# Patient Record
Sex: FEMALE | Race: WHITE | Employment: FULL TIME | RURAL
[De-identification: names, ages, dates, MRNs, and addresses within clinical notes are randomized per-mention and may not be internally consistent; named-entity substitution may affect disease eponyms.]

---

## 2022-03-08 ENCOUNTER — OFFICE VISIT (OUTPATIENT)
Dept: FAMILY MEDICINE CLINIC | Age: 27
End: 2022-03-08
Payer: COMMERCIAL

## 2022-03-08 VITALS
RESPIRATION RATE: 18 BRPM | DIASTOLIC BLOOD PRESSURE: 76 MMHG | HEART RATE: 99 BPM | HEIGHT: 67 IN | BODY MASS INDEX: 24.01 KG/M2 | TEMPERATURE: 98.6 F | WEIGHT: 153 LBS | SYSTOLIC BLOOD PRESSURE: 124 MMHG | OXYGEN SATURATION: 98 %

## 2022-03-08 DIAGNOSIS — Z13.0 SCREENING FOR DEFICIENCY ANEMIA: ICD-10-CM

## 2022-03-08 DIAGNOSIS — R10.11 RUQ ABDOMINAL PAIN: Primary | ICD-10-CM

## 2022-03-08 DIAGNOSIS — E55.9 VITAMIN D DEFICIENCY: ICD-10-CM

## 2022-03-08 DIAGNOSIS — Z13.220 SCREENING FOR HYPERLIPIDEMIA: ICD-10-CM

## 2022-03-08 DIAGNOSIS — D22.9 ATYPICAL MOLE: ICD-10-CM

## 2022-03-08 PROCEDURE — 99203 OFFICE O/P NEW LOW 30 MIN: CPT | Performed by: INTERNAL MEDICINE

## 2022-03-08 NOTE — PATIENT INSTRUCTIONS
Abdominal Pain: Care Instructions  Your Care Instructions     Abdominal pain has many possible causes. Some aren't serious and get better on their own in a few days. Others need more testing and treatment. If your pain continues or gets worse, you need to be rechecked and may need more tests to find out what is wrong. You may need surgery to correct the problem. Don't ignore new symptoms, such as fever, nausea and vomiting, urination problems, pain that gets worse, and dizziness. These may be signs of a more serious problem. Your doctor may have recommended a follow-up visit in the next 8 to 12 hours. If you are not getting better, you may need more tests or treatment. The doctor has checked you carefully, but problems can develop later. If you notice any problems or new symptoms, get medical treatment right away. Follow-up care is a key part of your treatment and safety. Be sure to make and go to all appointments, and call your doctor if you are having problems. It's also a good idea to know your test results and keep a list of the medicines you take. How can you care for yourself at home? · Rest until you feel better. · To prevent dehydration, drink plenty of fluids. Choose water and other clear liquids until you feel better. If you have kidney, heart, or liver disease and have to limit fluids, talk with your doctor before you increase the amount of fluids you drink. · If your stomach is upset, eat mild foods, such as rice, dry toast or crackers, bananas, and applesauce. Try eating several small meals instead of two or three large ones. · Wait until 48 hours after all symptoms have gone away before you have spicy foods, alcohol, and drinks that contain caffeine. · Do not eat foods that are high in fat. · Avoid anti-inflammatory medicines such as aspirin, ibuprofen (Advil, Motrin), and naproxen (Aleve). These can cause stomach upset.  Talk to your doctor if you take daily aspirin for another health problem. When should you call for help? Call 911 anytime you think you may need emergency care. For example, call if:    · You passed out (lost consciousness).     · You pass maroon or very bloody stools.     · You vomit blood or what looks like coffee grounds.     · You have new, severe belly pain. Call your doctor now or seek immediate medical care if:    · Your pain gets worse, especially if it becomes focused in one area of your belly.     · You have a new or higher fever.     · Your stools are black and look like tar, or they have streaks of blood.     · You have unexpected vaginal bleeding.     · You have symptoms of a urinary tract infection. These may include:  ? Pain when you urinate. ? Urinating more often than usual.  ? Blood in your urine.     · You are dizzy or lightheaded, or you feel like you may faint. Watch closely for changes in your health, and be sure to contact your doctor if:    · You are not getting better after 1 day (24 hours). Where can you learn more? Go to http://www.gray.com/  Enter U647 in the search box to learn more about \"Abdominal Pain: Care Instructions. \"  Current as of: July 1, 2021               Content Version: 13.0  © 7257-8356 [employer]. Care instructions adapted under license by [employer] (which disclaims liability or warranty for this information). If you have questions about a medical condition or this instruction, always ask your healthcare professional. [place] [address] any warranty or liability for your use of this information.

## 2022-03-08 NOTE — PROGRESS NOTES
Identified pt with two pt identifiers(name and ). Chief Complaint   Patient presents with    New Patient     est care    Abdominal Pain     RT side under rib cage x 11 months         Health Maintenance Due   Topic    Hepatitis C Screening     Depression Screen     COVID-19 Vaccine (1)    HPV Age 9Y-34Y (1 - 2-dose series)    DTaP/Tdap/Td series (1 - Tdap)    Pap Smear        Wt Readings from Last 3 Encounters:   22 153 lb (69.4 kg)     Temp Readings from Last 3 Encounters:   22 98.6 °F (37 °C) (Temporal)     BP Readings from Last 3 Encounters:   22 124/76     Pulse Readings from Last 3 Encounters:   22 99         Learning Assessment:  :     Learning Assessment 3/8/2022   PRIMARY LEARNER Patient   HIGHEST LEVEL OF EDUCATION - PRIMARY LEARNER  SOME COLLEGE   BARRIERS PRIMARY LEARNER NONE   CO-LEARNER CAREGIVER No   PRIMARY LANGUAGE ENGLISH   LEARNER PREFERENCE PRIMARY DEMONSTRATION   ANSWERED BY patient   RELATIONSHIP SELF       Depression Screening:  :     3 most recent PHQ Screens 3/8/2022   Little interest or pleasure in doing things Not at all   Feeling down, depressed, irritable, or hopeless Not at all   Total Score PHQ 2 0       Fall Risk Assessment:  :     No flowsheet data found. Abuse Screening:  :     Abuse Screening Questionnaire 3/8/2022   Do you ever feel afraid of your partner? N   Are you in a relationship with someone who physically or mentally threatens you? N   Is it safe for you to go home? Y       Coordination of Care Questionnaire:  :     1) Have you been to an emergency room, urgent care clinic since your last visit? no   Hospitalized since your last visit? no             2) Have you seen or consulted any other health care providers outside of [address] [place] [address] since your last visit? yes Dr Janna Coyle 10/2021- [facility]     3) Do you have an Advance Directive on file? no  Are you interested in receiving information about Advance Directives? no    4.   For patients aged 39-70: Has the patient had a colonoscopy / FIT/ Cologuard? NA - based on age      If the patient is female:    11. For patients aged 41-77: Has the patient had a mammogram within the past 2 years? NA - based on age or sex      10. For patients aged 21-65: Has the patient had a pap smear? Yes - Care Gap present.  Rooming MA/LPN to request most recent results Dr Connolly [place] 10/2021

## 2022-03-08 NOTE — PROGRESS NOTES
CHIEF COMPLAINT:   Chief Complaint   Patient presents with    New Patient     est care    Abdominal Pain     RT side under rib cage x 11 months        IMPRESSION AND PLAN:   Diagnoses and all orders for this visit:    1. RUQ abdominal pain  Comments:  On going for past 11 months. Started after she delivered her son. Orders:  -     US ABD LTD; Future  -     LIPASE; Future    2. Screening for hyperlipidemia  -     METABOLIC PANEL, COMPREHENSIVE; Future  -     LIPID PANEL; Future    3. Screening for deficiency anemia  -     CBC WITH AUTOMATED DIFF; Future    4. Vitamin D deficiency  -     VITAMIN D, 25 HYDROXY; Future    5. Atypical mole  -     REFERRAL TO DERMATOLOGY    I have discussed the diagnosis with the patient and the intended treatment plan as seen in the above orders. The patient has received an after-visit summary and questions were answered concerning future plans. Asked to return should symptoms worsen or not improve with treatment. Any pending labs and studies will be relayed to patient when they become available. Pt verbalizes understanding of plan of care and denies further questions or concerns at this time. Follow-up and Dispositions    · Return if symptoms worsen or fail to improve. HISTORY OF PRESENT ILLNESS:   26F who presents today as a new patient [facility].. She is generally healthy and has a now 9 month old baby. Just after delivery, she noticed R-sided cramping pain in the lower abdomen and a bulge which reduced and has not caused any other discomfort. But, she has been plagued by RUQ pain that comes and goes. Sometimes extremely sharp. No work up has been done. It has been worsening over the past 11 months. She reports that she told her gynecologist about this several times, but nothing seemed to be done. Just told \"give it 2-more weeks. \"     Because it is bothering her, she is seeking additional help to find out what is going on. No other constitutional symptoms.  Pain is constant and not improving with time. Little response to OTC medications. Lastly, she is concerned about a mole on the upper right back and along her neck. They have been there long term, but they are getting a little bigger. PAST MEDICAL HISTORY:  History reviewed. No pertinent surgical history. PAST SURGICAL HISTORY:  History reviewed. No pertinent surgical history. MEDICATIONS:  No medications    ALLERGIES:  No Known Allergies     SOCIAL HISTORY:   Social History     Tobacco Use    Smoking status: Never Smoker    Smokeless tobacco: Never Used   Vaping Use    Vaping Use: Never used   Substance Use Topics    Alcohol use: Not Currently    Drug use: Never         FAMILY HISTORY:   Family History   Problem Relation Age of Onset    Cancer Mother  from Leukemia @ 43years old.  Leukemia Mother     No Known Problems Sister     No Known Problems Brother     Dementia Maternal Grandmother     Cancer Maternal Grandfather         colon    No Known Problems Son          REVIEW OF SYSTEMS:  Review of Systems - Negative except for documented in the HPI. PHYSICAL EXAMINATION:  Visit Vitals  /76 (BP 1 Location: Right arm, BP Patient Position: Sitting, BP Cuff Size: Adult)   Pulse 99   Temp 98.6 °F (37 °C) (Temporal)   Resp 18   Ht 5' 7\" (1.702 m)   Wt 153 lb (69.4 kg)   LMP 2022 (Exact Date)   SpO2 98%   BMI 23.96 kg/m²     General appearance: alert, well appearing, and in no distress. Chest: clear to auscultation, no wheezes, rales or rhonchi, symmetric air entry. CVS exam: normal rate, regular rhythm, normal S1, S2, no murmurs, rubs, clicks or gallops. Abdominal exam: tenderness noted RUQ   Hepatomegaly? Liver edge felt 1-FB below ribcage. .  Exam of extremities: peripheral pulses normal, no pedal edema, no clubbing or cyanosis  Skin exam - normal coloration and turgor, no rashes, no suspicious skin lesions noted.   Neurological exam reveals alert, oriented, normal speech, no focal findings or movement disorder noted.       LABORATORY DATA:  Orders/pending

## 2022-03-10 ENCOUNTER — APPOINTMENT (OUTPATIENT)
Dept: FAMILY MEDICINE CLINIC | Age: 27
End: 2022-03-10

## 2022-03-11 LAB
25(OH)D3+25(OH)D2 SERPL-MCNC: 22.5 NG/ML (ref 30–100)
ALBUMIN SERPL-MCNC: 4.5 G/DL (ref 3.9–5)
ALBUMIN/GLOB SERPL: 1.7 {RATIO} (ref 1.2–2.2)
ALP SERPL-CCNC: 53 IU/L (ref 44–121)
ALT SERPL-CCNC: 11 IU/L (ref 0–32)
AST SERPL-CCNC: 17 IU/L (ref 0–40)
BASOPHILS # BLD AUTO: 0 X10E3/UL (ref 0–0.2)
BASOPHILS NFR BLD AUTO: 1 %
BILIRUB SERPL-MCNC: 0.5 MG/DL (ref 0–1.2)
BUN SERPL-MCNC: 10 MG/DL (ref 6–20)
BUN/CREAT SERPL: 13 (ref 9–23)
CALCIUM SERPL-MCNC: 9.6 MG/DL (ref 8.7–10.2)
CHLORIDE SERPL-SCNC: 103 MMOL/L (ref 96–106)
CHOLEST SERPL-MCNC: 155 MG/DL (ref 100–199)
CO2 SERPL-SCNC: 21 MMOL/L (ref 20–29)
CREAT SERPL-MCNC: 0.75 MG/DL (ref 0.57–1)
EGFR: 113 ML/MIN/1.73
EOSINOPHIL # BLD AUTO: 0.1 X10E3/UL (ref 0–0.4)
EOSINOPHIL NFR BLD AUTO: 1 %
ERYTHROCYTE [DISTWIDTH] IN BLOOD BY AUTOMATED COUNT: 12.2 % (ref 11.7–15.4)
GLOBULIN SER CALC-MCNC: 2.7 G/DL (ref 1.5–4.5)
GLUCOSE SERPL-MCNC: 76 MG/DL (ref 65–99)
HCT VFR BLD AUTO: 43.1 % (ref 34–46.6)
HDLC SERPL-MCNC: 47 MG/DL
HGB BLD-MCNC: 14.5 G/DL (ref 11.1–15.9)
IMM GRANULOCYTES # BLD AUTO: 0 X10E3/UL (ref 0–0.1)
IMM GRANULOCYTES NFR BLD AUTO: 0 %
IMP & REVIEW OF LAB RESULTS: NORMAL
INTERPRETATION: NORMAL
LDLC SERPL CALC-MCNC: 97 MG/DL (ref 0–99)
LYMPHOCYTES # BLD AUTO: 1.8 X10E3/UL (ref 0.7–3.1)
LYMPHOCYTES NFR BLD AUTO: 29 %
MCH RBC QN AUTO: 30.7 PG (ref 26.6–33)
MCHC RBC AUTO-ENTMCNC: 33.6 G/DL (ref 31.5–35.7)
MCV RBC AUTO: 91 FL (ref 79–97)
MONOCYTES # BLD AUTO: 0.5 X10E3/UL (ref 0.1–0.9)
MONOCYTES NFR BLD AUTO: 8 %
NEUTROPHILS # BLD AUTO: 3.9 X10E3/UL (ref 1.4–7)
NEUTROPHILS NFR BLD AUTO: 61 %
PLATELET # BLD AUTO: 297 X10E3/UL (ref 150–450)
POTASSIUM SERPL-SCNC: 4.7 MMOL/L (ref 3.5–5.2)
PROT SERPL-MCNC: 7.2 G/DL (ref 6–8.5)
RBC # BLD AUTO: 4.72 X10E6/UL (ref 3.77–5.28)
SODIUM SERPL-SCNC: 139 MMOL/L (ref 134–144)
TRIGL SERPL-MCNC: 55 MG/DL (ref 0–149)
VLDLC SERPL CALC-MCNC: 11 MG/DL (ref 5–40)
WBC # BLD AUTO: 6.3 X10E3/UL (ref 3.4–10.8)

## 2022-03-17 ENCOUNTER — HOSPITAL ENCOUNTER (OUTPATIENT)
Dept: ULTRASOUND IMAGING | Age: 27
Discharge: HOME | End: 2022-03-17
Attending: INTERNAL MEDICINE
Payer: COMMERCIAL

## 2022-03-17 ENCOUNTER — TELEPHONE (OUTPATIENT)
Dept: FAMILY MEDICINE CLINIC | Age: 27
End: 2022-03-17

## 2022-03-17 DIAGNOSIS — R10.11 RUQ ABDOMINAL PAIN: ICD-10-CM

## 2022-03-17 DIAGNOSIS — R10.11 RIGHT UPPER QUADRANT ABDOMINAL PAIN: Primary | ICD-10-CM

## 2022-03-17 PROCEDURE — 76705 ECHO EXAM OF ABDOMEN: CPT

## 2022-03-17 NOTE — TELEPHONE ENCOUNTER
Called pt and relayed results. She stated that it has not gotten better over the last week and has been going on for 11 months. She wants to know if CT can be ordered.

## 2022-03-17 NOTE — PROGRESS NOTES
Please let the patient know that her US was normal for the right side of the abdomen. If her symptoms persist or worsen, then I am happy to get a Ct-scan for further evaluation. But no acute issues were noted.

## 2022-03-17 NOTE — TELEPHONE ENCOUNTER
----- Message from Huma Paz MD sent at 3/17/2022 12:33 PM EDT -----  Please let the patient know that her US was normal for the right side of the abdomen. If her symptoms persist or worsen, then I am happy to get a Ct-scan for further evaluation. But no acute issues were noted.

## 2022-07-03 ENCOUNTER — HOSPITAL ENCOUNTER (EMERGENCY)
Age: 27
Discharge: HOME | End: 2022-07-03
Attending: EMERGENCY MEDICINE
Payer: COMMERCIAL

## 2022-07-03 ENCOUNTER — APPOINTMENT (OUTPATIENT)
Dept: CT IMAGING | Age: 27
End: 2022-07-03
Attending: STUDENT IN AN ORGANIZED HEALTH CARE EDUCATION/TRAINING PROGRAM
Payer: COMMERCIAL

## 2022-07-03 VITALS
WEIGHT: 145 LBS | SYSTOLIC BLOOD PRESSURE: 123 MMHG | HEART RATE: 111 BPM | OXYGEN SATURATION: 100 % | TEMPERATURE: 98 F | DIASTOLIC BLOOD PRESSURE: 89 MMHG | HEIGHT: 67 IN | BODY MASS INDEX: 22.76 KG/M2 | RESPIRATION RATE: 16 BRPM

## 2022-07-03 DIAGNOSIS — R10.11 ABDOMINAL PAIN, RIGHT UPPER QUADRANT: Primary | ICD-10-CM

## 2022-07-03 LAB
ALBUMIN SERPL-MCNC: 3.9 G/DL (ref 3.5–5)
ALBUMIN/GLOB SERPL: 1.1 {RATIO} (ref 1.1–2.2)
ALP SERPL-CCNC: 52 U/L (ref 45–117)
ALT SERPL-CCNC: 25 U/L (ref 12–78)
ANION GAP SERPL CALC-SCNC: 8 MMOL/L (ref 5–15)
APPEARANCE UR: CLEAR
AST SERPL-CCNC: 19 U/L (ref 15–37)
BACTERIA URNS QL MICRO: NEGATIVE /HPF
BASOPHILS # BLD: 0 K/UL (ref 0–0.1)
BASOPHILS NFR BLD: 0 % (ref 0–1)
BILIRUB SERPL-MCNC: 0.3 MG/DL (ref 0.2–1)
BILIRUB UR QL: NEGATIVE
BUN SERPL-MCNC: 10 MG/DL (ref 6–20)
BUN/CREAT SERPL: 12 (ref 12–20)
CALCIUM SERPL-MCNC: 8.9 MG/DL (ref 8.5–10.1)
CHLORIDE SERPL-SCNC: 107 MMOL/L (ref 97–108)
CO2 SERPL-SCNC: 25 MMOL/L (ref 21–32)
COLOR UR: NORMAL
COMMENT, HOLDF: NORMAL
CREAT SERPL-MCNC: 0.81 MG/DL (ref 0.55–1.02)
DIFFERENTIAL METHOD BLD: ABNORMAL
EOSINOPHIL # BLD: 0 K/UL (ref 0–0.4)
EOSINOPHIL NFR BLD: 0 % (ref 0–7)
EPITH CASTS URNS QL MICRO: NORMAL /LPF
ERYTHROCYTE [DISTWIDTH] IN BLOOD BY AUTOMATED COUNT: 12.3 % (ref 11.5–14.5)
GLOBULIN SER CALC-MCNC: 3.7 G/DL (ref 2–4)
GLUCOSE SERPL-MCNC: 72 MG/DL (ref 65–100)
GLUCOSE UR STRIP.AUTO-MCNC: NEGATIVE MG/DL
HCG UR QL: NEGATIVE
HCT VFR BLD AUTO: 39.9 % (ref 35–47)
HGB BLD-MCNC: 13.6 G/DL (ref 11.5–16)
HGB UR QL STRIP: NEGATIVE
HYALINE CASTS URNS QL MICRO: NORMAL /LPF (ref 0–2)
IMM GRANULOCYTES # BLD AUTO: 0 K/UL (ref 0–0.04)
IMM GRANULOCYTES NFR BLD AUTO: 0 % (ref 0–0.5)
KETONES UR QL STRIP.AUTO: NEGATIVE MG/DL
LEUKOCYTE ESTERASE UR QL STRIP.AUTO: NEGATIVE
LIPASE SERPL-CCNC: 180 U/L (ref 73–393)
LYMPHOCYTES # BLD: 1.6 K/UL (ref 0.8–3.5)
LYMPHOCYTES NFR BLD: 45 % (ref 12–49)
MCH RBC QN AUTO: 30.4 PG (ref 26–34)
MCHC RBC AUTO-ENTMCNC: 34.1 G/DL (ref 30–36.5)
MCV RBC AUTO: 89.3 FL (ref 80–99)
MONOCYTES # BLD: 0.7 K/UL (ref 0–1)
MONOCYTES NFR BLD: 21 % (ref 5–13)
NEUTS SEG # BLD: 1.2 K/UL (ref 1.8–8)
NEUTS SEG NFR BLD: 34 % (ref 32–75)
NITRITE UR QL STRIP.AUTO: NEGATIVE
NRBC # BLD: 0 K/UL (ref 0–0.01)
NRBC BLD-RTO: 0 PER 100 WBC
PH UR STRIP: 6.5 [PH] (ref 5–8)
PLATELET # BLD AUTO: 225 K/UL (ref 150–400)
PMV BLD AUTO: 10.5 FL (ref 8.9–12.9)
POTASSIUM SERPL-SCNC: 3.9 MMOL/L (ref 3.5–5.1)
PROT SERPL-MCNC: 7.6 G/DL (ref 6.4–8.2)
PROT UR STRIP-MCNC: NEGATIVE MG/DL
RBC # BLD AUTO: 4.47 M/UL (ref 3.8–5.2)
RBC #/AREA URNS HPF: NORMAL /HPF (ref 0–5)
RBC MORPH BLD: ABNORMAL
SAMPLES BEING HELD,HOLD: NORMAL
SODIUM SERPL-SCNC: 140 MMOL/L (ref 136–145)
SP GR UR REFRACTOMETRY: 1.01 (ref 1–1.03)
UR CULT HOLD, URHOLD: NORMAL
UROBILINOGEN UR QL STRIP.AUTO: 0.2 EU/DL (ref 0.2–1)
WBC # BLD AUTO: 3.5 K/UL (ref 3.6–11)
WBC URNS QL MICRO: NORMAL /HPF (ref 0–4)

## 2022-07-03 PROCEDURE — 81025 URINE PREGNANCY TEST: CPT

## 2022-07-03 PROCEDURE — 99285 EMERGENCY DEPT VISIT HI MDM: CPT

## 2022-07-03 PROCEDURE — 36415 COLL VENOUS BLD VENIPUNCTURE: CPT

## 2022-07-03 PROCEDURE — 85025 COMPLETE CBC W/AUTO DIFF WBC: CPT

## 2022-07-03 PROCEDURE — 83690 ASSAY OF LIPASE: CPT

## 2022-07-03 PROCEDURE — 74177 CT ABD & PELVIS W/CONTRAST: CPT

## 2022-07-03 PROCEDURE — 80053 COMPREHEN METABOLIC PANEL: CPT

## 2022-07-03 PROCEDURE — 74011000636 HC RX REV CODE- 636: Performed by: EMERGENCY MEDICINE

## 2022-07-03 PROCEDURE — 81001 URINALYSIS AUTO W/SCOPE: CPT

## 2022-07-03 RX ADMIN — IOPAMIDOL 100 ML: 755 INJECTION, SOLUTION INTRAVENOUS at 13:35

## 2022-07-03 NOTE — DISCHARGE INSTRUCTIONS
Continue to monitor symptoms at home. Take Advil or Tylenol as needed for pain. Follow-up with PCP and GI and return with any changes or worsening.

## 2022-07-03 NOTE — ED TRIAGE NOTES
Pt to ED for c/o R sided abd pain x1 year intermittent. Has been referred to GI and has not made appointment.  States \"just coming to get it checked\"     Denies urinary sx, N/V/D, fever or chills

## 2022-07-03 NOTE — ED PROVIDER NOTES
60-year-old female with no significant past medical history presents to ED with 1 year of intermittent right-sided abdominal pain. Patient reports that about a couple weeks after she gave birth to her son she started noticing intermittent right-sided abdominal pain that came on suddenly and was sharp in nature, lasting for couple hours and then resolving. This happens a couple times a month. She has brought up to her OB/GYN several times and talk to her PCP about this. Her PCP ordered a right upper quadrant ultrasound and did lab and urine work which was unremarkable. Her PCP also ordered a CT which patient never scheduled. [facility] again blood and urine was unremarkable, they recommended that she see GI. She has not made this appointment yet. She notes that she came into the ED today to try to figure out what is going on. She denies any nausea, vomiting, diarrhea, chest pain, shortness of breath, dysuria, urinary frequency. Abdominal Pain   Pertinent negatives include no fever, no nausea, no vomiting, no dysuria, no headaches, no myalgias and no chest pain. No past medical history on file. No past surgical history on file.       Family History:   Problem Relation Age of Onset   Yoly Curl Cancer Mother     Leukemia Mother     No Known Problems Sister     No Known Problems Brother     Dementia Maternal Grandmother     Cancer Maternal Grandfather         colon    No Known Problems Son        Social History     Socioeconomic History    Marital status: SINGLE     Spouse name: Not on file    Number of children: Not on file    Years of education: Not on file    Highest education level: Not on file   Occupational History    Not on file   Tobacco Use    Smoking status: Never Smoker    Smokeless tobacco: Never Used   Vaping Use    Vaping Use: Never used   Substance and Sexual Activity    Alcohol use: Not Currently    Drug use: Never    Sexual activity: Yes     Partners: Male     Birth control/protection: Condom   Other Topics Concern    Not on file   Social History Narrative    Not on file     Social Determinants of Health     Financial Resource Strain:     Difficulty of Paying Living Expenses: Not on file   Food Insecurity:     Worried About Running Out of Food in the Last Year: Not on file    Ronald of Food in the Last Year: Not on file   Transportation Needs:     Lack of Transportation (Medical): Not on file    Lack of Transportation (Non-Medical): Not on file   Physical Activity:     Days of Exercise per Week: Not on file    Minutes of Exercise per Session: Not on file   Stress:     Feeling of Stress : Not on file   Social Connections:     Frequency of Communication with Friends and Family: Not on file    Frequency of Social Gatherings with Friends and Family: Not on file    Attends Rastafari Services: Not on file    Active Member of [address] [place] [address] [employer] or Organizations: Not on file    Attends Club or Organization Meetings: Not on file    Marital Status: Not on file   Intimate Partner Violence:     Fear of Current or Ex-Partner: Not on file    Emotionally Abused: Not on file    Physically Abused: Not on file    Sexually Abused: Not on file   Housing Stability:     Unable to Pay for Housing in the Last Year: Not on file    Number of [place] in the Last Year: Not on file    Unstable Housing in the Last Year: Not on file         ALLERGIES: Patient has no known allergies. Review of Systems   Constitutional: Negative for fever. HENT: Negative for congestion and sinus pressure. Respiratory: Negative for shortness of breath. Cardiovascular: Negative for chest pain. Gastrointestinal: Positive for abdominal pain. Negative for nausea and vomiting. Genitourinary: Negative for dysuria. Musculoskeletal: Negative for myalgias. Neurological: Negative for dizziness and headaches. Hematological: Negative for adenopathy. Psychiatric/Behavioral: The patient is not nervous/anxious. All other systems reviewed and are negative. Vitals:    07/03/22 1142 07/03/22 1143   BP: 123/89    Pulse: (!) 111    Resp: 16    Temp:  98 °F (36.7 °C)   SpO2: 100%    Weight: 65.8 kg (145 lb)    Height: 5' 7\" (1.702 m)             Physical Exam  Vitals and nursing note reviewed. Constitutional:       General: She is not in acute distress. Appearance: Normal appearance. She is normal weight. HENT:      Head: Normocephalic and atraumatic. Eyes:      Extraocular Movements: Extraocular movements intact. Pupils: Pupils are equal, round, and reactive to light. Cardiovascular:      Rate and Rhythm: Normal rate and regular rhythm. Heart sounds: Normal heart sounds. Pulmonary:      Breath sounds: Normal breath sounds. Abdominal:      Palpations: Abdomen is soft. Tenderness: There is no abdominal tenderness. Lymphadenopathy:      Cervical: No cervical adenopathy. Skin:     General: Skin is warm and dry. Neurological:      General: No focal deficit present. Mental Status: She is alert and oriented to person, place, and time. Psychiatric:         Mood and Affect: Mood normal.         Behavior: Behavior normal.         Thought Content: Thought content normal.          MDM  Number of Diagnoses or Management Options  Abdominal pain, right upper quadrant  Diagnosis management comments: 52-year-old female with no significant past medical history presents to ED with 1 year of intermittent right-sided abdominal pain. Vital signs stable in triage and patient is afebrile. Physical exam unremarkable with no abdominal tenderness. Patient has already had multiple negative work-ups with PCP and IBD but labs and urine showed no acute abnormalities today and CT of abdomen and pelvis showed no acute findings. Patient will be discharged with instructions for conservative care, follow-up and return precautions.        Amount and/or Complexity of Data Reviewed  Clinical lab tests: ordered and reviewed  Tests in the radiology section of CPT®: reviewed and ordered  Discuss the patient with other providers: yes           Procedures

## 2022-07-19 ENCOUNTER — OFFICE VISIT (OUTPATIENT)
Dept: FAMILY MEDICINE CLINIC | Age: 27
End: 2022-07-19
Payer: COMMERCIAL

## 2022-07-19 ENCOUNTER — APPOINTMENT (OUTPATIENT)
Dept: FAMILY MEDICINE CLINIC | Age: 27
End: 2022-07-19

## 2022-07-19 VITALS
RESPIRATION RATE: 16 BRPM | OXYGEN SATURATION: 98 % | HEIGHT: 67 IN | HEART RATE: 61 BPM | SYSTOLIC BLOOD PRESSURE: 122 MMHG | TEMPERATURE: 97.6 F | BODY MASS INDEX: 22.6 KG/M2 | DIASTOLIC BLOOD PRESSURE: 68 MMHG | WEIGHT: 144 LBS

## 2022-07-19 DIAGNOSIS — Z11.59 ENCOUNTER FOR HEPATITIS C SCREENING TEST FOR LOW RISK PATIENT: ICD-10-CM

## 2022-07-19 DIAGNOSIS — R51.9 PERSISTENT HEADACHES: Primary | ICD-10-CM

## 2022-07-19 DIAGNOSIS — D72.821 MONOCYTOSIS: ICD-10-CM

## 2022-07-19 DIAGNOSIS — Z87.39 HISTORY OF SCOLIOSIS: ICD-10-CM

## 2022-07-19 PROCEDURE — 99214 OFFICE O/P EST MOD 30 MIN: CPT | Performed by: INTERNAL MEDICINE

## 2022-07-19 RX ORDER — IBUPROFEN 200 MG
600 TABLET ORAL
COMMUNITY

## 2022-07-19 NOTE — PATIENT INSTRUCTIONS
Headache: Care Instructions  Your Care Instructions     Headaches have many possible causes. Most headaches aren't a sign of a more serious problem, and they will get better on their own. Home treatment may help you feel better faster. The doctor has checked you carefully, but problems can develop later. If you notice any problems or new symptoms, get medical treatment right away. Follow-up care is a key part of your treatment and safety. Be sure to make and go to all appointments, and call your doctor if you are having problems. It's also a good idea to know your test results and keep a list of the medicines you take. How can you care for yourself at home? · Do not drive if you have taken a prescription pain medicine. · Rest in a quiet, dark room until your headache is gone. Close your eyes and try to relax or go to sleep. Don't watch TV or read. · Put a cold, moist cloth or cold pack on the painful area for 10 to 20 minutes at a time. Put a thin cloth between the cold pack and your skin. · Use a warm, moist towel or a heating pad set on low to relax tight shoulder and neck muscles. · Have someone gently massage your neck and shoulders. · Take pain medicines exactly as directed. ? If the doctor gave you a prescription medicine for pain, take it as prescribed. ? If you are not taking a prescription pain medicine, ask your doctor if you can take an over-the-counter medicine. · Be careful not to take pain medicine more often than the instructions allow, because you may get worse or more frequent headaches when the medicine wears off. · Do not ignore new symptoms that occur with a headache, such as a fever, weakness or numbness, vision changes, or confusion. These may be signs of a more serious problem. To prevent headaches  · Keep a headache diary so you can figure out what triggers your headaches. Avoiding triggers may help you prevent headaches.  Record when each headache began, how long it lasted, and what the pain was like (throbbing, aching, stabbing, or dull). Write down any other symptoms you had with the headache, such as nausea, flashing lights or dark spots, or sensitivity to bright light or loud noise. Note if the headache occurred near your period. List anything that might have triggered the headache, such as certain foods (chocolate, cheese, wine) or odors, smoke, bright light, stress, or lack of sleep. · Find healthy ways to deal with stress. Headaches are most common during or right after stressful times. Take time to relax before and after you do something that has caused a headache in the past.  · Try to keep your muscles relaxed by keeping good posture. Check your jaw, face, neck, and shoulder muscles for tension, and try relaxing them. When sitting at a desk, change positions often, and stretch for 30 seconds each hour. · Get plenty of sleep and exercise. · Eat regularly and well. Long periods without food can trigger a headache. · Treat yourself to a massage. Some people find that regular massages are very helpful in relieving tension. · Limit caffeine by not drinking too much coffee, tea, or soda. But don't quit caffeine suddenly, because that can also give you headaches. · Reduce eyestrain from computers by blinking frequently and looking away from the computer screen every so often. Make sure you have proper eyewear and that your monitor is set up properly, about an arm's length away. · Seek help if you have depression or anxiety. Your headaches may be linked to these conditions. Treatment can both prevent headaches and help with symptoms of anxiety or depression. When should you call for help? Call 911 anytime you think you may need emergency care. For example, call if:    · You have signs of a stroke. These may include:  ? Sudden numbness, paralysis, or weakness in your face, arm, or leg, especially on only one side of your body. ? Sudden vision changes.   ? Sudden trouble speaking. ? Sudden confusion or trouble understanding simple statements. ? Sudden problems with walking or balance. ? A sudden, severe headache that is different from past headaches. Call your doctor now or seek immediate medical care if:    · You have a new or worse headache.     · Your headache gets much worse. Where can you learn more? Go to http://www.francisco.com/  Enter M271 in the search box to learn more about \"Headache: Care Instructions. \"  Current as of: December 13, 2021               Content Version: 13.2  © 7144-2882 [employer]. Care instructions adapted under license by [employer] (which disclaims liability or warranty for this information). If you have questions about a medical condition or this instruction, always ask your healthcare professional. [address] any warranty or liability for your use of this information. Tension Headache: Care Instructions  Overview  Most headaches are tension headaches. Some people get them often, especially if they have a lot of stress in their lives. This kind of headache may cause pain or a feeling of pressure all over your head. Sometimes it's hard to know where the center of the pain is. If you get a lot of these kind of headaches, the best way to reduce them is to find out what's causing them. Then you can make changes in those areas. Follow-up care is a key part of your treatment and safety. Be sure to make and go to all appointments, and call your doctor if you are having problems. It's also a good idea to know your test results and keep a list of the medicines you take. How can you care for yourself at home? · Rest in a quiet, dark room. Put a cool cloth on your forehead. Close your eyes, and try to relax or go to sleep. Do not watch TV, read, or use the computer. · Use a warm, moist towel or a heating pad set on low to relax tight shoulder and neck muscles.   · Have someone gently massage your neck and shoulders. · Be safe with medicines. Read and follow all instructions on the label. ? If the doctor gave you a prescription medicine for pain, take it as prescribed. ? If you are not taking a prescription pain medicine, ask your doctor if you can take an over-the-counter medicine. · Be careful not to take more pain medicine than the instructions say. This is because you may get worse or more frequent headaches when the medicine wears off. · If you get a headache, stop what you are doing and sit quietly for a moment. Close your eyes and breathe slowly. Try to relax your head and neck muscles. · Pay attention to any new symptoms you have when you have a headache. These include a fever, weakness or numbness, vision changes, or confusion. They may be signs of a more serious problem. How can you prevent tension headaches? Here are some things you can do to help prevent tension headaches. · Keep a headache diary. This can help you and your doctor figure out what is triggering your headaches. If you avoid your triggers, you may be able to prevent headaches. · Find healthy ways to deal with stress. Headaches are most common during or right after stressful times. · Get plenty of exercise every day. This can help with stress and muscle tension. · Get regular sleep. · Eat regularly and well. If you wait too long to eat, it can trigger a headache. · Try to use good posture and keep the muscles of your jaw, face, neck, and shoulders relaxed. · If you use a computer a lot, give your eyes a break by blinking more and sometimes looking away from the screen. Use glasses or contacts if you need them. And check that your monitor is about an arm's distance away. When should you call for help? Call 911 anytime you think you may need emergency care. For example, call if:    · You have signs of a stroke.  These may include:  ? Sudden numbness, paralysis, or weakness in your face, arm, or leg, especially on only one side of your body. ? Sudden vision changes. ? Sudden trouble speaking. ? Sudden confusion or trouble understanding simple statements. ? Sudden problems with walking or balance. ? A sudden, severe headache that is different from past headaches. Call your doctor now or seek immediate medical care if:    · You have new or worse nausea and vomiting.     · You have a new or higher fever.     · Your headache gets much worse. Watch closely for changes in your health, and be sure to contact your doctor if:    · You are not getting better after 2 days (48 hours). Where can you learn more? Go to http://www.gray.com/  Enter C544 in the search box to learn more about \"Tension Headache: Care Instructions. \"  Current as of: December 13, 2021               Content Version: 13.2  © 0750-7313 [employer]. Care instructions adapted under license by [employer] (which disclaims liability or warranty for this information). If you have questions about a medical condition or this instruction, always ask your healthcare professional. [place] [address] any warranty or liability for your use of this information.

## 2022-07-19 NOTE — PROGRESS NOTES
Identified pt with two pt identifiers(name and ). Chief Complaint   Patient presents with    Headache     at least 4 x a week    Scoliosis        Health Maintenance Due   Topic    Hepatitis C Screening     COVID-19 Vaccine (1)    HPV Age 9Y-34Y (1 - 2-dose series)    DTaP/Tdap/Td series (1 - Tdap)    Pap Smear        Wt Readings from Last 3 Encounters:   22 144 lb (65.3 kg)   22 145 lb (65.8 kg)   22 153 lb (69.4 kg)     Temp Readings from Last 3 Encounters:   22 97.6 °F (36.4 °C) (Temporal)   22 98 °F (36.7 °C)   22 98.6 °F (37 °C) (Temporal)     BP Readings from Last 3 Encounters:   22 122/68   22 123/89   22 124/76     Pulse Readings from Last 3 Encounters:   22 61   22 (!) 111   22 99         Learning Assessment:  :     Learning Assessment 3/8/2022   PRIMARY LEARNER Patient   HIGHEST LEVEL OF EDUCATION - PRIMARY LEARNER  SOME COLLEGE   BARRIERS PRIMARY LEARNER NONE   CO-LEARNER CAREGIVER No   PRIMARY LANGUAGE ENGLISH   LEARNER PREFERENCE PRIMARY DEMONSTRATION   ANSWERED BY patient   RELATIONSHIP SELF       Depression Screening:  :     3 most recent PHQ Screens 3/8/2022   Little interest or pleasure in doing things Not at all   Feeling down, depressed, irritable, or hopeless Not at all   Total Score PHQ 2 0       Fall Risk Assessment:  :     No flowsheet data found. Abuse Screening:  :     Abuse Screening Questionnaire 3/8/2022   Do you ever feel afraid of your partner? N   Are you in a relationship with someone who physically or mentally threatens you? N   Is it safe for you to go home? Y       Coordination of Care Questionnaire:  :     1) Have you been to an emergency room, urgent care clinic since your last visit? yes [facility] 7/3/22 & [facility]  Hospitalized since your last visit? no             2) Have you seen or consulted any other health care providers outside of [address] [place] [address] since your last visit? no  (Include any pap smears or colon screenings in this section.)    3) Do you have an Advance Directive on file? no  Are you interested in receiving information about Advance Directives? no    4. For patients aged 39-70: Has the patient had a colonoscopy / FIT/ Cologuard? NA      If the patient is female:    5. For patients aged 41-77: Has the patient had a mammogram within the past 2 years? NA - based on age or sex      10. For patients aged 21-65: Has the patient had a pap smear?  Yes - no Care Gap present 10/2021 Dr Yong Francisco

## 2022-07-21 LAB
BASOPHILS # BLD AUTO: 0 X10E3/UL (ref 0–0.2)
BASOPHILS NFR BLD AUTO: 1 %
EOSINOPHIL # BLD AUTO: 0.1 X10E3/UL (ref 0–0.4)
EOSINOPHIL NFR BLD AUTO: 1 %
ERYTHROCYTE [DISTWIDTH] IN BLOOD BY AUTOMATED COUNT: 12.3 % (ref 11.7–15.4)
HCT VFR BLD AUTO: 44.6 % (ref 34–46.6)
HCV AB S/CO SERPL IA: <0.1 S/CO RATIO (ref 0–0.9)
HGB BLD-MCNC: 14.8 G/DL (ref 11.1–15.9)
IMM GRANULOCYTES # BLD AUTO: 0 X10E3/UL (ref 0–0.1)
IMM GRANULOCYTES NFR BLD AUTO: 0 %
LYMPHOCYTES # BLD AUTO: 1.7 X10E3/UL (ref 0.7–3.1)
LYMPHOCYTES NFR BLD AUTO: 35 %
MCH RBC QN AUTO: 30.2 PG (ref 26.6–33)
MCHC RBC AUTO-ENTMCNC: 33.2 G/DL (ref 31.5–35.7)
MCV RBC AUTO: 91 FL (ref 79–97)
MONOCYTES # BLD AUTO: 0.4 X10E3/UL (ref 0.1–0.9)
MONOCYTES NFR BLD AUTO: 9 %
NEUTROPHILS # BLD AUTO: 2.6 X10E3/UL (ref 1.4–7)
NEUTROPHILS NFR BLD AUTO: 54 %
PATH INTERP BLD-IMP: NORMAL
PATH REV BLD -IMP: NORMAL
PATHOLOGIST NAME: NORMAL
PLATELET # BLD AUTO: 295 X10E3/UL (ref 150–450)
RBC # BLD AUTO: 4.9 X10E6/UL (ref 3.77–5.28)
WBC # BLD AUTO: 4.8 X10E3/UL (ref 3.4–10.8)

## 2022-07-24 NOTE — PROGRESS NOTES
Chief Complaint   Patient presents with    Headache     at least 4 x a week    Scoliosis       Assessment/ Plan:   Diagnoses and all orders for this visit:    1. Persistent headaches  -     CT HEAD W WO CONT; Future    2. Monocytosis  -     CBC WITH AUTOMATED DIFF; Future  -     PATHOLOGIST REVIEW SMEARS    3. Encounter for hepatitis C screening test for low risk patient  -     HEPATITIS C AB; Future    4. History of scoliosis  Comments:  Had bracing in adolescents. feels like her posture is worsening. Orders:  -     XR SPINE ENTIRE T-L , SKULL TO SACRUM 2 OR 3 VWS SCOLIOSIS; Future    Other orders  -     HEPATITIS C AB    I have discussed the diagnosis with the patient and the intended treatment plan as seen in the above orders. The patient has received an after-visit summary and questions were answered concerning future plans. Asked to return should symptoms worsen or not improve with treatment. Any pending labs and studies will be relayed to patient when they become available. Pt verbalizes understanding of plan of care and denies further questions or concerns at this time. Follow-up and Dispositions    Return if symptoms worsen or fail to improve. Subjective:   Radha Navarro is a 33 y.o. female who presents with several concerns. First, she has had a persistent headache for the past several weeks on and off. It is in the posterior R-side of her head. She denies any trauma. No skin lesions in the area. She is not having the pain today. The pain generally stays in this area, but she can feel vice like pressure of her entire scalp. She has noticed some mild visual disturbance, but denies photophobia. No previous history of migraine headaches. Feels that these are not like her usual headaches. They are more intense when they come on and last longer. Also, she would like evaluation of possible scoliosis (recurrent).  She reports that she did wear a brace in adolescence and is worried that her posture is getting worse despite trying to control by posture and movement. The patient also recently went to the ER c/o RUQ abdominal pain which we had done a work up and also asked for CT-scan. Per ER report/course:    ER Course 7/3/2022:  \"33year-old female with no significant past medical history presents to ED with 1 year of intermittent right-sided abdominal pain. Patient reports that about a couple weeks after she gave birth to her son she started noticing intermittent right-sided abdominal pain that came on suddenly and was sharp in nature, lasting for couple hours and then resolving. This happens a couple times a month. She has brought up to her OB/GYN several times and talk to her PCP about this. Her PCP ordered a right upper quadrant ultrasound and did lab and urine work which was unremarkable. Her PCP also ordered a CT which patient never scheduled. [facility] again blood and urine was unremarkable, they recommended that she see GI. She has not made this appointment yet. She notes that she came into the ED today to try to figure out what is going on. She denies any nausea, vomiting, diarrhea, chest pain, shortness of breath, dysuria, urinary frequency. CT was done in the ER and noted to be normal/negative. I have reviewed as outlined below     CT Results (most recent):  Results from Hospital Encounter encounter on 07/03/22    CT ABD PELV W CONT    Narrative  EXAM: CT ABD PELV W CONT    INDICATION: intermittent RUQ abd pain; negative US    COMPARISON: None    CONTRAST: 100 mL of Isovue-370. ORAL CONTRAST: None    TECHNIQUE:  Following the uneventful intravenous administration of contrast, thin axial  images were obtained through the abdomen and pelvis. Coronal and sagittal  reconstructions were generated.  CT dose reduction was achieved through use of a  standardized protocol tailored for this examination and automatic exposure  control for dose modulation. FINDINGS:  LOWER THORAX: No significant abnormality in the incidentally imaged lower chest.  LIVER: No mass. BILIARY TREE: Gallbladder is within normal limits. CBD is not dilated. SPLEEN: within normal limits. PANCREAS: No mass or ductal dilatation. ADRENALS: Unremarkable. KIDNEYS: No mass, calculus, or hydronephrosis. STOMACH: Unremarkable. SMALL BOWEL: No dilatation or wall thickening. COLON: No dilatation or wall thickening. APPENDIX: Appears normal  PERITONEUM: No ascites or pneumoperitoneum. RETROPERITONEUM: No lymphadenopathy or aortic aneurysm. REPRODUCTIVE ORGANS: Appears normal with multiple follicles within both ovaries  URINARY BLADDER: No mass or calculus. BONES: No destructive bone lesion. ABDOMINAL WALL: No mass or hernia. ADDITIONAL COMMENTS: N/A    Impression  No acute findings seen. She did have unremarkable CT in the ER as noted above. She now denies any abdominal pain. I note that she is a first time mother with an 13 month old at home. She has had some anxieties about her health since having her baby. She denies any additional concerns today. HISTORICAL  PMH, PSH, FHX, SOCHX, ALLERGIES and MES were reviewed and updated today. Review of Systems  Review of Systems   Musculoskeletal:  Positive for back pain. Neurological:  Positive for headaches. All other systems reviewed and are negative. Objective:     Vitals:    07/19/22 1010   BP: 122/68   Pulse: 61   Resp: 16   Temp: 97.6 °F (36.4 °C)   TempSrc: Temporal   SpO2: 98%   Weight: 144 lb (65.3 kg)   Height: 5' 7\" (1.702 m)       Physical Exam  Vitals and nursing note reviewed. Constitutional:       Appearance: Normal appearance. HENT:      Head: Normocephalic and atraumatic. Mouth/Throat:      Mouth: Mucous membranes are moist.   Eyes:      Extraocular Movements: Extraocular movements intact.       Conjunctiva/sclera: Conjunctivae normal.      Pupils: Pupils are equal, round, and reactive to light. Cardiovascular:      Rate and Rhythm: Normal rate and regular rhythm. Pulses: Normal pulses. Heart sounds: Normal heart sounds. Pulmonary:      Effort: Pulmonary effort is normal.      Breath sounds: Normal breath sounds. Musculoskeletal:      Cervical back: Normal range of motion and neck supple. Neurological:      General: No focal deficit present. Mental Status: She is alert. Mental status is at baseline. Cranial Nerves: No cranial nerve deficit. Sensory: No sensory deficit. Motor: No weakness. Coordination: Coordination normal.      Gait: Gait normal.      Deep Tendon Reflexes: Reflexes normal.   Psychiatric:         Mood and Affect: Mood normal.         Behavior: Behavior normal.         Thought Content: Thought content normal.         Judgment: Judgment normal.     Meredith Angel MD  [facility]     Patient Instructions        Headache: Care Instructions  Your Care Instructions     Headaches have many possible causes. Most headaches aren't a sign of a more serious problem, and they will get better on their own. Home treatment may help you feel better faster. The doctor has checked you carefully, but problems can develop later. If you notice any problems or new symptoms, get medical treatment right away. Follow-up care is a key part of your treatment and safety. Be sure to make and go to all appointments, and call your doctor if you are having problems. It's also a good idea to know your test results and keep a list of the medicines you take. How can you care for yourself at home? Do not drive if you have taken a prescription pain medicine. Rest in a quiet, dark room until your headache is gone. Close your eyes and try to relax or go to sleep. Don't watch TV or read. Put a cold, moist cloth or cold pack on the painful area for 10 to 20 minutes at a time. Put a thin cloth between the cold pack and your skin.   Use a warm, moist towel or a heating pad set on low to relax tight shoulder and neck muscles. Have someone gently massage your neck and shoulders. Take pain medicines exactly as directed. If the doctor gave you a prescription medicine for pain, take it as prescribed. If you are not taking a prescription pain medicine, ask your doctor if you can take an over-the-counter medicine. Be careful not to take pain medicine more often than the instructions allow, because you may get worse or more frequent headaches when the medicine wears off. Do not ignore new symptoms that occur with a headache, such as a fever, weakness or numbness, vision changes, or confusion. These may be signs of a more serious problem. To prevent headaches  Keep a headache diary so you can figure out what triggers your headaches. Avoiding triggers may help you prevent headaches. Record when each headache began, how long it lasted, and what the pain was like (throbbing, aching, stabbing, or dull). Write down any other symptoms you had with the headache, such as nausea, flashing lights or dark spots, or sensitivity to bright light or loud noise. Note if the headache occurred near your period. List anything that might have triggered the headache, such as certain foods (chocolate, cheese, wine) or odors, smoke, bright light, stress, or lack of sleep. Find healthy ways to deal with stress. Headaches are most common during or right after stressful times. Take time to relax before and after you do something that has caused a headache in the past.  Try to keep your muscles relaxed by keeping good posture. Check your jaw, face, neck, and shoulder muscles for tension, and try relaxing them. When sitting at a desk, change positions often, and stretch for 30 seconds each hour. Get plenty of sleep and exercise. Eat regularly and well. Long periods without food can trigger a headache. Treat yourself to a massage.  Some people find that regular massages are very helpful in relieving tension. Limit caffeine by not drinking too much coffee, tea, or soda. But don't quit caffeine suddenly, because that can also give you headaches. Reduce eyestrain from computers by blinking frequently and looking away from the computer screen every so often. Make sure you have proper eyewear and that your monitor is set up properly, about an arm's length away. Seek help if you have depression or anxiety. Your headaches may be linked to these conditions. Treatment can both prevent headaches and help with symptoms of anxiety or depression. When should you call for help? Call 911 anytime you think you may need emergency care. For example, call if:    You have signs of a stroke. These may include:  Sudden numbness, paralysis, or weakness in your face, arm, or leg, especially on only one side of your body. Sudden vision changes. Sudden trouble speaking. Sudden confusion or trouble understanding simple statements. Sudden problems with walking or balance. A sudden, severe headache that is different from past headaches. Call your doctor now or seek immediate medical care if:    You have a new or worse headache. Your headache gets much worse. Where can you learn more? Go to http://www.francisco.com/  Enter M271 in the search box to learn more about \"Headache: Care Instructions. \"  Current as of: December 13, 2021               Content Version: 13.2  © 7286-3942 [employer]. Care instructions adapted under license by [employer] (which disclaims liability or warranty for this information). If you have questions about a medical condition or this instruction, always ask your healthcare professional. [place] [address] any warranty or liability for your use of this information. Tension Headache: Care Instructions  Overview  Most headaches are tension headaches.  Some people get them often, especially if they have a lot of stress in their lives. This kind of headache may cause pain or a feeling of pressure all over your head. Sometimes it's hard to know where the center of the pain is. If you get a lot of these kind of headaches, the best way to reduce them is to find out what's causing them. Then you can make changes in those areas. Follow-up care is a key part of your treatment and safety. Be sure to make and go to all appointments, and call your doctor if you are having problems. It's also a good idea to know your test results and keep a list of the medicines you take. How can you care for yourself at home? Rest in a quiet, dark room. Put a cool cloth on your forehead. Close your eyes, and try to relax or go to sleep. Do not watch TV, read, or use the computer. Use a warm, moist towel or a heating pad set on low to relax tight shoulder and neck muscles. Have someone gently massage your neck and shoulders. Be safe with medicines. Read and follow all instructions on the label. If the doctor gave you a prescription medicine for pain, take it as prescribed. If you are not taking a prescription pain medicine, ask your doctor if you can take an over-the-counter medicine. Be careful not to take more pain medicine than the instructions say. This is because you may get worse or more frequent headaches when the medicine wears off. If you get a headache, stop what you are doing and sit quietly for a moment. Close your eyes and breathe slowly. Try to relax your head and neck muscles. Pay attention to any new symptoms you have when you have a headache. These include a fever, weakness or numbness, vision changes, or confusion. They may be signs of a more serious problem. How can you prevent tension headaches? Here are some things you can do to help prevent tension headaches. Keep a headache diary. This can help you and your doctor figure out what is triggering your headaches.  If you avoid your triggers, you may be able to prevent headaches. Find healthy ways to deal with stress. Headaches are most common during or right after stressful times. Get plenty of exercise every day. This can help with stress and muscle tension. Get regular sleep. Eat regularly and well. If you wait too long to eat, it can trigger a headache. Try to use good posture and keep the muscles of your jaw, face, neck, and shoulders relaxed. If you use a computer a lot, give your eyes a break by blinking more and sometimes looking away from the screen. Use glasses or contacts if you need them. And check that your monitor is about an arm's distance away. When should you call for help? Call 911 anytime you think you may need emergency care. For example, call if:    You have signs of a stroke. These may include:  Sudden numbness, paralysis, or weakness in your face, arm, or leg, especially on only one side of your body. Sudden vision changes. Sudden trouble speaking. Sudden confusion or trouble understanding simple statements. Sudden problems with walking or balance. A sudden, severe headache that is different from past headaches. Call your doctor now or seek immediate medical care if:    You have new or worse nausea and vomiting. You have a new or higher fever. Your headache gets much worse. Watch closely for changes in your health, and be sure to contact your doctor if:    You are not getting better after 2 days (48 hours). Where can you learn more? Go to http://www.gray.com/  Enter C544 in the search box to learn more about \"Tension Headache: Care Instructions. \"  Current as of: December 13, 2021               Content Version: 13.2  © 9192-4375 [employer]. Care instructions adapted under license by [employer] (which disclaims liability or warranty for this information).  If you have questions about a medical condition or this instruction, always ask your healthcare professional. [employer] disclaims any warranty or liability for your use of this information.

## 2023-03-21 ENCOUNTER — OFFICE VISIT (OUTPATIENT)
Dept: FAMILY MEDICINE CLINIC | Age: 28
End: 2023-03-21
Payer: MEDICAID

## 2023-03-21 VITALS
WEIGHT: 135.2 LBS | HEART RATE: 98 BPM | HEIGHT: 67 IN | TEMPERATURE: 98 F | SYSTOLIC BLOOD PRESSURE: 136 MMHG | BODY MASS INDEX: 21.22 KG/M2 | RESPIRATION RATE: 16 BRPM | OXYGEN SATURATION: 100 % | DIASTOLIC BLOOD PRESSURE: 80 MMHG

## 2023-03-21 DIAGNOSIS — J02.0 STREP PHARYNGITIS: Primary | ICD-10-CM

## 2023-03-21 DIAGNOSIS — F32.A ANXIETY AND DEPRESSION: ICD-10-CM

## 2023-03-21 DIAGNOSIS — J02.9 SORE THROAT: ICD-10-CM

## 2023-03-21 DIAGNOSIS — R53.81 MALAISE AND FATIGUE: ICD-10-CM

## 2023-03-21 DIAGNOSIS — F41.9 ANXIETY AND DEPRESSION: ICD-10-CM

## 2023-03-21 DIAGNOSIS — R53.83 MALAISE AND FATIGUE: ICD-10-CM

## 2023-03-21 LAB
S PYO AG THROAT QL: POSITIVE
VALID INTERNAL CONTROL?: YES

## 2023-03-21 PROCEDURE — 99213 OFFICE O/P EST LOW 20 MIN: CPT | Performed by: INTERNAL MEDICINE

## 2023-03-21 PROCEDURE — 87880 STREP A ASSAY W/OPTIC: CPT | Performed by: INTERNAL MEDICINE

## 2023-03-21 RX ORDER — SERTRALINE HYDROCHLORIDE 25 MG/1
25 TABLET, FILM COATED ORAL DAILY
Qty: 30 TABLET | Refills: 3 | Status: SHIPPED | OUTPATIENT
Start: 2023-03-21

## 2023-03-21 RX ORDER — AMOXICILLIN 875 MG/1
875 TABLET, FILM COATED ORAL 2 TIMES DAILY
Qty: 20 TABLET | Refills: 0 | Status: SHIPPED | OUTPATIENT
Start: 2023-03-21 | End: 2023-03-31

## 2023-03-21 NOTE — PROGRESS NOTES
Chief Complaint   Patient presents with    Anxiety    Depression    Ear Pain     Possible ear infection        Assessment/ Plan:   Diagnoses and all orders for this visit:    1. Strep pharyngitis  -     amoxicillin (AMOXIL) 875 mg tablet; Take 1 Tablet by mouth two (2) times a day for 10 days. 2. Malaise and fatigue  -     METABOLIC PANEL, COMPREHENSIVE; Future  -     CBC WITH AUTOMATED DIFF; Future  -     THYROID CASCADE PROFILE; Future    3. Sore throat  -     AMB POC RAPID STREP A    4. Anxiety and depression  -     sertraline (ZOLOFT) 25 mg tablet; Take 1 Tablet by mouth daily. I have discussed the diagnosis with the patient and the intended treatment plan as seen in the above orders. The patient has received an after-visit summary and questions were answered concerning future plans. Asked to return should symptoms worsen or not improve with treatment. Any pending labs and studies will be relayed to patient when they become available. Pt verbalizes understanding of plan of care and denies further questions or concerns at this time. Follow-up and Dispositions    Return if symptoms worsen or fail to improve, for follow up anxiety, Follow up depression. Subjective:   Michell Hemphill is a 33 y.o. female who presents today with several concerns. She has been having increased anxiety and depression since her mother  of Lymphoma 2-years ago in December. She has noticed increased inability to focus and concentrate and often quite sad. In addition, she is worried about ear pain and mostly sore throat. She denies SI/SA. She is working on a prn basis as a dialysis tech. She has 1 child. He is 23 months. She has not been treated for depression in the past with medications. She was speaking to a counselor. HISTORICAL  PMH, PSH, FHX, SOCHX, ALLERGIES and MES were reviewed and updated today.       Review of Systems  Review of Systems   Constitutional:  Positive for malaise/fatigue. HENT:  Positive for ear pain and sore throat. All other systems reviewed and are negative. Objective:     Vitals:    03/21/23 1347   BP: 136/80   Pulse: 98   Resp: 16   Temp: 98 °F (36.7 °C)   TempSrc: Temporal   SpO2: 100%   Weight: 135 lb 3.2 oz (61.3 kg)   Height: 5' 7\" (1.702 m)       Physical Exam  HENT:      Mouth/Throat:      Mouth: Mucous membranes are moist.      Pharynx: Posterior oropharyngeal erythema present. Cardiovascular:      Rate and Rhythm: Normal rate and regular rhythm. Pulses: Normal pulses. Heart sounds: Normal heart sounds. Pulmonary:      Effort: Pulmonary effort is normal.      Breath sounds: Normal breath sounds. Musculoskeletal:      Cervical back: Normal range of motion and neck supple. Skin:     Capillary Refill: Capillary refill takes less than 2 seconds. Neurological:      Mental Status: Mental status is at baseline. Psychiatric:         Mood and Affect: Mood normal.         Behavior: Behavior normal.         Thought Content:  Thought content normal.         Judgment: Judgment normal.       Brittany Limon MD  [facility]   03/21/2023

## 2023-03-21 NOTE — PATIENT INSTRUCTIONS
Will treat as outlined above. To complete ALL of the antibiotics. Change toothbrush in 48 hours. Gargle, drink plenty of fluids. No sharing of utensils.

## 2023-03-21 NOTE — PROGRESS NOTES
Identified pt with two pt identifiers(name and ). Chief Complaint   Patient presents with    Anxiety    Depression    Ear Pain     Possible ear infection         Health Maintenance Due   Topic    COVID-19 Vaccine (1)    HPV Age 9Y-34Y (1 - 2-dose series)    DTaP/Tdap/Td series (1 - Tdap)    Pap Smear     Flu Vaccine (1)    Depression Screen        Wt Readings from Last 3 Encounters:   23 135 lb 3.2 oz (61.3 kg)   22 144 lb (65.3 kg)   22 145 lb (65.8 kg)     Temp Readings from Last 3 Encounters:   23 98 °F (36.7 °C) (Temporal)   22 97.6 °F (36.4 °C) (Temporal)   22 98 °F (36.7 °C)     BP Readings from Last 3 Encounters:   23 136/80   22 122/68   22 123/89     Pulse Readings from Last 3 Encounters:   23 98   22 61   22 (!) 111         Learning Assessment:  :     Learning Assessment 3/8/2022   PRIMARY LEARNER Patient   HIGHEST LEVEL OF EDUCATION - PRIMARY LEARNER  SOME COLLEGE   BARRIERS PRIMARY LEARNER NONE   CO-LEARNER CAREGIVER No   PRIMARY LANGUAGE ENGLISH   LEARNER PREFERENCE PRIMARY DEMONSTRATION   ANSWERED BY patient   RELATIONSHIP SELF       Depression Screening:  :     3 most recent PHQ Screens 3/21/2023   Little interest or pleasure in doing things Not at all   Feeling down, depressed, irritable, or hopeless Not at all   Total Score PHQ 2 0       Fall Risk Assessment:  :     No flowsheet data found. Abuse Screening:  :     Abuse Screening Questionnaire 3/21/2023 3/8/2022   Do you ever feel afraid of your partner? N N   Are you in a relationship with someone who physically or mentally threatens you? N N   Is it safe for you to go home?  Y Y       Coordination of Care Questionnaire:  :     1) Have you been to an emergency room, urgent care clinic since your last visit? no   Hospitalized since your last visit? no             2) Have you seen or consulted any other health care providers outside of [address] [place] [address] since your last visit? yes  [facility]  (Include any pap smears or colon screenings in this section.)    3) Do you have an Advance Directive on file? no  Are you interested in receiving information about Advance Directives? no    Patient is accompanied by self I have received verbal consent from Gustavo Macias to discuss any/all medical information while they are present in the room. 4.  For patients aged 39-70: Has the patient had a colonoscopy / FIT/ Cologuard? NA - based on age      If the patient is female:    11. For patients aged 41-77: Has the patient had a mammogram within the past 2 years? NA - based on age or sex      10. For patients aged 21-65: Has the patient had a pap smear?  No

## 2023-03-23 ENCOUNTER — HOSPITAL ENCOUNTER (OUTPATIENT)
Dept: CT IMAGING | Age: 28
Discharge: HOME | End: 2023-03-23
Attending: INTERNAL MEDICINE
Payer: MEDICAID

## 2023-03-23 DIAGNOSIS — R51.9 PERSISTENT HEADACHES: ICD-10-CM

## 2023-03-23 PROCEDURE — 70470 CT HEAD/BRAIN W/O & W/DYE: CPT

## 2023-03-23 PROCEDURE — 74011000636 HC RX REV CODE- 636: Performed by: INTERNAL MEDICINE

## 2023-03-23 RX ADMIN — IOPAMIDOL 80 ML: 755 INJECTION, SOLUTION INTRAVENOUS at 14:20

## 2023-03-24 ENCOUNTER — TELEPHONE (OUTPATIENT)
Dept: FAMILY MEDICINE CLINIC | Age: 28
End: 2023-03-24

## 2023-03-24 NOTE — TELEPHONE ENCOUNTER
----- Message from Eveline Curry MD sent at 3/23/2023 10:26 PM EDT -----  Please let patient know that his head CT was normal. He should follow up as needed. Thanks!

## 2023-04-04 ENCOUNTER — APPOINTMENT (OUTPATIENT)
Dept: FAMILY MEDICINE CLINIC | Age: 28
End: 2023-04-04

## 2023-04-06 ENCOUNTER — OFFICE VISIT (OUTPATIENT)
Dept: FAMILY MEDICINE CLINIC | Age: 28
End: 2023-04-06

## 2023-04-06 NOTE — PROGRESS NOTES
Identified pt with two pt identifiers(name and ).     Chief Complaint   Patient presents with    Chest Pain     Patient has had reoccurring chest pain going into her back and down to her left arm for the past few months, [facility]     Labs     Patient would like to discuss recent lab results         Health Maintenance Due   Topic    COVID-19 Vaccine (1)    Varicella Vaccine (1 of 2 - 2-dose childhood series)    HPV Age 9Y-34Y (1 - 2-dose series)    DTaP/Tdap/Td series (1 - Tdap)    Pap Smear        Wt Readings from Last 3 Encounters:   23 135 lb 3.2 oz (61.3 kg)   22 144 lb (65.3 kg)   22 145 lb (65.8 kg)     Temp Readings from Last 3 Encounters:   23 98 °F (36.7 °C) (Temporal)   22 97.6 °F (36.4 °C) (Temporal)   22 98 °F (36.7 °C)     BP Readings from Last 3 Encounters:   23 136/80   22 122/68   22 123/89     Pulse Readings from Last 3 Encounters:   23 98   22 61   22 (!) 111         Learning Assessment:  :     Learning Assessment 3/8/2022   PRIMARY LEARNER Patient   HIGHEST LEVEL OF EDUCATION - PRIMARY LEARNER  SOME COLLEGE   BARRIERS PRIMARY LEARNER NONE   CO-LEARNER CAREGIVER No   PRIMARY LANGUAGE ENGLISH   LEARNER PREFERENCE PRIMARY DEMONSTRATION   ANSWERED BY patient   RELATIONSHIP SELF       Depression Screening:  :     3 most recent PHQ Screens 2023   Little interest or pleasure in doing things Not at all   Feeling down, depressed, irritable, or hopeless Not at all   Total Score PHQ 2 0   Trouble falling or staying asleep, or sleeping too much Not at all   Feeling tired or having little energy Not at all   Poor appetite, weight loss, or overeating Not at all   Feeling bad about yourself - or that you are a failure or have let yourself or your family down Not at all   Trouble concentrating on things such as school, work, reading, or watching TV Not at all   Moving or speaking so slowly that other people could have noticed; or the opposite being so fidgety that others notice Not at all   Thoughts of being better off dead, or hurting yourself in some way Not at all   PHQ 9 Score 0   How difficult have these problems made it for you to do your work, take care of your home and get along with others Not difficult at all       Fall Risk Assessment:  :     No flowsheet data found. Abuse Screening:  :     Abuse Screening Questionnaire 3/21/2023 3/8/2022   Do you ever feel afraid of your partner? N N   Are you in a relationship with someone who physically or mentally threatens you? N N   Is it safe for you to go home? Y Y       Coordination of Care Questionnaire:  :     1) Have you been to an emergency room, urgent care clinic since your last visit? no   Hospitalized since your last visit? no             2) Have you seen or consulted any other health care providers outside of [address] [place] [address] since your last visit? no  (Include any pap smears or colon screenings in this section.)    3) Do you have an Advance Directive on file? no  Are you interested in receiving information about Advance Directives? no    Patient is accompanied by self I have received verbal consent from Samson Martinez to discuss any/all medical information while they are present in the room. 4.  For patients aged 39-70: Has the patient had a colonoscopy / FIT/ Cologuard? NA - based on age      If the patient is female:    11. For patients aged 41-77: Has the patient had a mammogram within the past 2 years? NA - based on age or sex      10. For patients aged 21-65: Has the patient had a pap smear?  Yes - no [facility] gordon Kenny

## 2023-04-07 ENCOUNTER — TELEPHONE (OUTPATIENT)
Dept: FAMILY MEDICINE CLINIC | Age: 28
End: 2023-04-07

## 2023-04-13 ENCOUNTER — OFFICE VISIT (OUTPATIENT)
Dept: FAMILY MEDICINE CLINIC | Age: 28
End: 2023-04-13
Payer: MEDICAID

## 2023-04-13 VITALS
HEART RATE: 80 BPM | BODY MASS INDEX: 21.25 KG/M2 | RESPIRATION RATE: 16 BRPM | HEIGHT: 67 IN | TEMPERATURE: 98.4 F | DIASTOLIC BLOOD PRESSURE: 74 MMHG | WEIGHT: 135.4 LBS | OXYGEN SATURATION: 99 % | SYSTOLIC BLOOD PRESSURE: 130 MMHG

## 2023-04-13 DIAGNOSIS — M62.830 PARASPINAL MUSCLE SPASM: ICD-10-CM

## 2023-04-13 DIAGNOSIS — N39.0 URINARY TRACT INFECTION WITHOUT HEMATURIA, SITE UNSPECIFIED: ICD-10-CM

## 2023-04-13 DIAGNOSIS — J06.0 SORE THROAT AND LARYNGITIS: Primary | ICD-10-CM

## 2023-04-13 LAB
BILIRUB UR QL STRIP: NEGATIVE
GLUCOSE UR-MCNC: NEGATIVE MG/DL
KETONES P FAST UR STRIP-MCNC: NEGATIVE MG/DL
PH UR STRIP: 7 [PH] (ref 4.6–8)
PROT UR QL STRIP: NEGATIVE
SP GR UR STRIP: 1.01 (ref 1–1.03)
UA UROBILINOGEN AMB POC: NORMAL (ref 0.2–1)
URINALYSIS CLARITY POC: CLEAR
URINALYSIS COLOR POC: YELLOW
URINE BLOOD POC: NEGATIVE
URINE LEUKOCYTES POC: NEGATIVE
URINE NITRITES POC: NEGATIVE

## 2023-04-13 PROCEDURE — 81001 URINALYSIS AUTO W/SCOPE: CPT | Performed by: FAMILY MEDICINE

## 2023-04-13 PROCEDURE — 99214 OFFICE O/P EST MOD 30 MIN: CPT | Performed by: FAMILY MEDICINE

## 2023-04-13 NOTE — PROGRESS NOTES
Identified pt with two pt identifiers(name and ).     Chief Complaint   Patient presents with    Mass     Patient stated she had strep 3 weeks ago and still has swollen glands and a sore throat     Back Pain     Patient has had back pain for a few days and thinks she may have a kidney infection         Health Maintenance Due   Topic    COVID-19 Vaccine (1)    Varicella Vaccine (1 of 2 - 2-dose childhood series)    HPV Age 9Y-26Y (1 - 2-dose series)    DTaP/Tdap/Td series (1 - Tdap)    Pap Smear        Wt Readings from Last 3 Encounters:   23 135 lb 12.8 oz (61.6 kg)   23 135 lb 3.2 oz (61.3 kg)   22 144 lb (65.3 kg)     Temp Readings from Last 3 Encounters:   23 98.3 °F (36.8 °C) (Temporal)   23 98 °F (36.7 °C) (Temporal)   22 97.6 °F (36.4 °C) (Temporal)     BP Readings from Last 3 Encounters:   23 122/72   23 136/80   22 122/68     Pulse Readings from Last 3 Encounters:   23 81   23 98   22 61         Learning Assessment:  :     Learning Assessment 3/8/2022   PRIMARY LEARNER Patient   HIGHEST LEVEL OF EDUCATION - PRIMARY LEARNER  SOME COLLEGE   BARRIERS PRIMARY LEARNER NONE   CO-LEARNER CAREGIVER No   PRIMARY LANGUAGE ENGLISH   LEARNER PREFERENCE PRIMARY DEMONSTRATION   ANSWERED BY patient   RELATIONSHIP SELF       Depression Screening:  :     3 most recent PHQ Screens 2023   Little interest or pleasure in doing things Not at all   Feeling down, depressed, irritable, or hopeless Not at all   Total Score PHQ 2 0   Trouble falling or staying asleep, or sleeping too much Not at all   Feeling tired or having little energy Not at all   Poor appetite, weight loss, or overeating Not at all   Feeling bad about yourself - or that you are a failure or have let yourself or your family down Not at all   Trouble concentrating on things such as school, work, reading, or watching TV Not at all   Moving or speaking so slowly that other people could have noticed; or the opposite being so fidgety that others notice Not at all   Thoughts of being better off dead, or hurting yourself in some way Not at all   PHQ 9 Score 0   How difficult have these problems made it for you to do your work, take care of your home and get along with others Not difficult at all       Fall Risk Assessment:  :     No flowsheet data found. Abuse Screening:  :     Abuse Screening Questionnaire 3/21/2023 3/8/2022   Do you ever feel afraid of your partner? N N   Are you in a relationship with someone who physically or mentally threatens you? N N   Is it safe for you to go home? Y Y       Coordination of Care Questionnaire:  :     1) Have you been to an emergency room, urgent care clinic since your last visit? no   Hospitalized since your last visit? no             2) Have you seen or consulted any other health care providers outside of [address] [place] [address] since your last visit? no  (Include any pap smears or colon screenings in this section.)    3) Do you have an Advance Directive on file? no  Are you interested in receiving information about Advance Directives? no    Patient is accompanied by self I have received verbal consent from Zora Barahona to discuss any/all medical information while they are present in the room. 4.  For patients aged 39-70: Has the patient had a colonoscopy / FIT/ Cologuard? NA - based on age      If the patient is female:    11. For patients aged 41-77: Has the patient had a mammogram within the past 2 years? NA - based on age or sex      10. For patients aged 21-65: Has the patient had a pap smear?  Yes - no [facility] present Carly Thorpe

## 2023-04-14 ENCOUNTER — TELEPHONE (OUTPATIENT)
Dept: FAMILY MEDICINE CLINIC | Age: 28
End: 2023-04-14

## 2023-04-14 LAB
APPEARANCE UR: CLEAR
BILIRUB UR QL STRIP: NEGATIVE
COLOR UR: YELLOW
GLUCOSE UR QL STRIP: NEGATIVE
HGB UR QL STRIP: NEGATIVE
KETONES UR QL STRIP: NEGATIVE
LEUKOCYTE ESTERASE UR QL STRIP: NEGATIVE
MICRO URNS: ABNORMAL
NITRITE UR QL STRIP: NEGATIVE
PH UR STRIP: 7.5 [PH] (ref 5–7.5)
PROT UR QL STRIP: NEGATIVE
SP GR UR STRIP: <=1.005 (ref 1–1.03)
UROBILINOGEN UR STRIP-MCNC: 0.2 MG/DL (ref 0.2–1)

## 2023-04-14 RX ORDER — AMOXICILLIN AND CLAVULANATE POTASSIUM 500; 125 MG/1; MG/1
1 TABLET, FILM COATED ORAL EVERY 12 HOURS
Qty: 20 TABLET | Refills: 0 | Status: SHIPPED | OUTPATIENT
Start: 2023-04-14 | End: 2023-04-24

## 2023-04-17 NOTE — PROGRESS NOTES
SUBJECTIVE: 33 y.o. female with sore throat, myalgias, swollen glands for 3 weeks. No history of rheumatic fever. Other symptoms: coryza, congestion, and sore throat. OBJECTIVE:   Vitals as noted above. Appears alert, well appearing, and in no distress, oriented to person, place, and time, and normal appearing weight. Ears: bilateral TM's and external ear canals normal  Oropharynx: mucous membranes moist, erythematous, and tonsils hypertrophied with exudate  Neck: supple, no significant adenopathy  Lungs: clear to auscultation, no wheezes, rales or rhonchi, symmetric air entry      ASSESSMENT: Streptococcal pharyngitis    PLAN: Per orders. Gargle, use acetaminophen or other OTC analgesic, and take Rx fully as prescribed. Call if other family members develop similar symptoms. See prn.    1. Sore throat and laryngitis    - ECHO ADULT COMPLETE; Future  - XR CHEST SNGL V; Future  - amoxicillin-clavulanate (AUGMENTIN) 500-125 mg per tablet; Take 1 Tablet by mouth every twelve (12) hours for 10 days. Dispense: 20 Tablet; Refill: 0    2. Paraspinal muscle spasm    - ECHO ADULT COMPLETE; Future  - XR CHEST SNGL V; Future    3. Urinary tract infection without hematuria, site unspecified    - URINALYSIS W/ RFLX MICROSCOPIC;  Future  - AMB POC URINALYSIS DIP STICK AUTO W/ MICRO  - URINALYSIS W/ RFLX MICROSCOPIC  - XR CHEST SNGL V; Future

## 2023-04-21 ENCOUNTER — HOSPITAL ENCOUNTER (OUTPATIENT)
Dept: NON INVASIVE DIAGNOSTICS | Age: 28
End: 2023-04-21
Attending: FAMILY MEDICINE
Payer: MEDICAID

## 2023-04-21 VITALS
WEIGHT: 136.69 LBS | SYSTOLIC BLOOD PRESSURE: 122 MMHG | HEIGHT: 67 IN | DIASTOLIC BLOOD PRESSURE: 78 MMHG | BODY MASS INDEX: 21.45 KG/M2

## 2023-04-21 DIAGNOSIS — M62.830 PARASPINAL MUSCLE SPASM: ICD-10-CM

## 2023-04-21 DIAGNOSIS — J06.0 SORE THROAT AND LARYNGITIS: ICD-10-CM

## 2023-04-21 LAB
ECHO AO ASC DIAM: 2.7 CM
ECHO AO ASCENDING AORTA INDEX: 1.57 CM/M2
ECHO AV AREA PEAK VELOCITY: 2.8 CM2
ECHO AV AREA VTI: 2.7 CM2
ECHO AV AREA/BSA PEAK VELOCITY: 1.6 CM2/M2
ECHO AV AREA/BSA VTI: 1.6 CM2/M2
ECHO AV MEAN GRADIENT: 3 MMHG
ECHO AV MEAN VELOCITY: 0.8 M/S
ECHO AV PEAK GRADIENT: 4 MMHG
ECHO AV PEAK VELOCITY: 1 M/S
ECHO AV VELOCITY RATIO: 0.9
ECHO AV VTI: 21.4 CM
ECHO LA DIAMETER INDEX: 1.45 CM/M2
ECHO LA DIAMETER: 2.5 CM
ECHO LA VOL 2C: 55 ML (ref 22–52)
ECHO LA VOL 4C: 34 ML (ref 22–52)
ECHO LA VOL BP: 43 ML (ref 22–52)
ECHO LA VOL/BSA BIPLANE: 25 ML/M2 (ref 16–34)
ECHO LA VOLUME AREA LENGTH: 53 ML
ECHO LA VOLUME INDEX A2C: 32 ML/M2 (ref 16–34)
ECHO LA VOLUME INDEX A4C: 20 ML/M2 (ref 16–34)
ECHO LA VOLUME INDEX AREA LENGTH: 31 ML/M2 (ref 16–34)
ECHO LV E' LATERAL VELOCITY: 19 CM/S
ECHO LV E' SEPTAL VELOCITY: 14 CM/S
ECHO LV EDV A2C: 104 ML
ECHO LV EDV A4C: 97 ML
ECHO LV EDV BP: 101 ML (ref 56–104)
ECHO LV EDV INDEX A4C: 56 ML/M2
ECHO LV EDV INDEX BP: 59 ML/M2
ECHO LV EDV NDEX A2C: 60 ML/M2
ECHO LV EJECTION FRACTION A2C: 67 %
ECHO LV EJECTION FRACTION A4C: 57 %
ECHO LV EJECTION FRACTION BIPLANE: 62 % (ref 55–100)
ECHO LV ESV A2C: 34 ML
ECHO LV ESV A4C: 42 ML
ECHO LV ESV BP: 39 ML (ref 19–49)
ECHO LV ESV INDEX A2C: 20 ML/M2
ECHO LV ESV INDEX A4C: 24 ML/M2
ECHO LV ESV INDEX BP: 23 ML/M2
ECHO LV FRACTIONAL SHORTENING: 32 % (ref 28–44)
ECHO LV INTERNAL DIMENSION DIASTOLE INDEX: 2.73 CM/M2
ECHO LV INTERNAL DIMENSION DIASTOLIC: 4.7 CM (ref 3.9–5.3)
ECHO LV INTERNAL DIMENSION SYSTOLIC INDEX: 1.86 CM/M2
ECHO LV INTERNAL DIMENSION SYSTOLIC: 3.2 CM
ECHO LV IVSD: 0.7 CM (ref 0.6–0.9)
ECHO LV MASS 2D: 103.1 G (ref 67–162)
ECHO LV MASS INDEX 2D: 59.9 G/M2 (ref 43–95)
ECHO LV POSTERIOR WALL DIASTOLIC: 0.7 CM (ref 0.6–0.9)
ECHO LV RELATIVE WALL THICKNESS RATIO: 0.3
ECHO LVOT AREA: 3.1 CM2
ECHO LVOT AV VTI INDEX: 0.85
ECHO LVOT DIAM: 2 CM
ECHO LVOT MEAN GRADIENT: 2 MMHG
ECHO LVOT PEAK GRADIENT: 3 MMHG
ECHO LVOT PEAK VELOCITY: 0.9 M/S
ECHO LVOT STROKE VOLUME INDEX: 33 ML/M2
ECHO LVOT SV: 56.8 ML
ECHO LVOT VTI: 18.1 CM
ECHO MV A VELOCITY: 0.44 M/S
ECHO MV E DECELERATION TIME (DT): 221 MS
ECHO MV E VELOCITY: 0.69 M/S
ECHO MV E/A RATIO: 1.57
ECHO MV E/E' LATERAL: 3.63
ECHO MV E/E' RATIO (AVERAGED): 4.28
ECHO MV E/E' SEPTAL: 4.93
ECHO PV MAX VELOCITY: 1.1 M/S
ECHO PV PEAK GRADIENT: 4 MMHG
ECHO RV FREE WALL PEAK S': 12 CM/S
ECHO RV INTERNAL DIMENSION: 2.7 CM
ECHO RV TAPSE: 2 CM (ref 1.7–?)
ECHO RVOT MEAN GRADIENT: 1 MMHG
ECHO RVOT PEAK GRADIENT: 1 MMHG
ECHO RVOT PEAK VELOCITY: 0.6 M/S
ECHO RVOT VTI: 12 CM
ECHO TV REGURGITANT MAX VELOCITY: 1.8 M/S
ECHO TV REGURGITANT PEAK GRADIENT: 13 MMHG

## 2023-04-21 PROCEDURE — 93306 TTE W/DOPPLER COMPLETE: CPT | Performed by: STUDENT IN AN ORGANIZED HEALTH CARE EDUCATION/TRAINING PROGRAM

## 2023-04-21 PROCEDURE — 93306 TTE W/DOPPLER COMPLETE: CPT

## 2023-04-25 ENCOUNTER — OFFICE VISIT (OUTPATIENT)
Dept: FAMILY MEDICINE CLINIC | Age: 28
End: 2023-04-25
Payer: MEDICAID

## 2023-04-25 VITALS
OXYGEN SATURATION: 99 % | BODY MASS INDEX: 21.12 KG/M2 | RESPIRATION RATE: 16 BRPM | SYSTOLIC BLOOD PRESSURE: 120 MMHG | HEIGHT: 67 IN | WEIGHT: 134.6 LBS | HEART RATE: 90 BPM | DIASTOLIC BLOOD PRESSURE: 78 MMHG | TEMPERATURE: 98.5 F

## 2023-04-25 DIAGNOSIS — B95.0 GROUP A STREPTOCOCCAL INFECTION: Primary | ICD-10-CM

## 2023-04-25 DIAGNOSIS — J06.0 SORE THROAT AND LARYNGITIS: ICD-10-CM

## 2023-04-25 DIAGNOSIS — K14.8 TONGUE LESION: ICD-10-CM

## 2023-04-25 LAB
S PYO AG THROAT QL: POSITIVE
VALID INTERNAL CONTROL?: YES

## 2023-04-25 PROCEDURE — 87880 STREP A ASSAY W/OPTIC: CPT | Performed by: INTERNAL MEDICINE

## 2023-04-25 PROCEDURE — 99213 OFFICE O/P EST LOW 20 MIN: CPT | Performed by: INTERNAL MEDICINE

## 2023-04-25 RX ORDER — CLINDAMYCIN HYDROCHLORIDE 150 MG/1
150 CAPSULE ORAL 3 TIMES DAILY
Qty: 21 CAPSULE | Refills: 0 | Status: SHIPPED | OUTPATIENT
Start: 2023-04-25 | End: 2023-05-02

## 2023-04-25 NOTE — PROGRESS NOTES
Chief Complaint   Patient presents with    Sore Throat     Left side     Ear Pain     Both ears mainly right ear        Assessment/ Plan:   Diagnoses and all orders for this visit:    1. Group A streptococcal infection   Just finished Augmentin yesterday. Strep is still positive. Will treat with Clindamycin. May eradicate the carrier state   Will treat as outlined above. To complete ALL of the antibiotics. Change toothbrush in 48 hours. Gargle, drink plenty of fluids. No sharing of utensils. 2. Sore throat and laryngitis  -     AMB POC RAPID STREP A  -     clindamycin (CLEOCIN) 150 mg capsule; Take 1 Capsule by mouth three (3) times daily for 7 days. 3. Tongue lesion  -     REFERRAL TO ENT-OTOLARYNGOLOGY    I have discussed the diagnosis with the patient and the intended treatment plan as seen in the above orders. The patient has received an after-visit summary and questions were answered concerning future plans. Asked to return should symptoms worsen or not improve with treatment. Any pending labs and studies will be relayed to patient when they become available. Pt verbalizes understanding of plan of care and denies further questions or concerns at this time. Subjective:   Oscar Marie is a 33 y.o. female who presents for follow up of sore throat, mostly on the L-side, bilateral ear pain that worsens at night and a small lesion on the anterior R-side of her tongue. She noticed the lesion on the tongue several weeks ago. It does not hurt. Seems a little bigger. It does not bleed. It is entirely possible she could have burned her tongue with hot food, but she does not recall. Of note, she just finished a course of Augmentin yesterday and developed continued symptoms of sore throat and rapid strep in the office was once again positive. Possible, this could be a carrier, but unfortunately, she is also symptomatic.      HISTORICAL  PMH, PSH, FHX, SOCHX, ALLERGIES and MES were reviewed and updated today. Review of Systems  Review of Systems   Constitutional:  Positive for malaise/fatigue. HENT:  Positive for sore throat. Objective:     Vitals:    04/25/23 1628   BP: 120/78   Pulse: 90   Resp: 16   Temp: 98.5 °F (36.9 °C)   TempSrc: Temporal   SpO2: 99%   Weight: 134 lb 9.6 oz (61.1 kg)   Height: 5' 7\" (1.702 m)       Physical Exam  HENT:      Right Ear: Hearing, tympanic membrane, ear canal and external ear normal.      Left Ear: Hearing, tympanic membrane, ear canal and external ear normal.      Mouth/Throat:     Cardiovascular:      Rate and Rhythm: Normal rate and regular rhythm. Pulses: Normal pulses. Heart sounds: Normal heart sounds. Musculoskeletal:      Cervical back: Normal range of motion. Lymphadenopathy:      Cervical: Cervical adenopathy present.      Linda Rand MD  [facility]   04/27/23

## 2023-04-25 NOTE — PROGRESS NOTES
Identified pt with two pt identifiers(name and ).     Chief Complaint   Patient presents with    Sore Throat     Left side     Ear Pain     Both ears mainly right ear         Health Maintenance Due   Topic    COVID-19 Vaccine (1)    Varicella Vaccine (1 of 2 - 2-dose childhood series)    HPV Age 9Y-26Y (1 - 2-dose series)    DTaP/Tdap/Td series (1 - Tdap)    Pap Smear        Wt Readings from Last 3 Encounters:   23 134 lb 9.6 oz (61.1 kg)   23 136 lb 11 oz (62 kg)   23 135 lb 6.4 oz (61.4 kg)     Temp Readings from Last 3 Encounters:   23 98.5 °F (36.9 °C) (Temporal)   23 98.4 °F (36.9 °C) (Temporal)   23 98.3 °F (36.8 °C) (Temporal)     BP Readings from Last 3 Encounters:   23 120/78   23 122/78   23 130/74     Pulse Readings from Last 3 Encounters:   23 90   23 80   23 81         Learning Assessment:  :     Learning Assessment 3/8/2022   PRIMARY LEARNER Patient   HIGHEST LEVEL OF EDUCATION - PRIMARY LEARNER  SOME COLLEGE   BARRIERS PRIMARY LEARNER NONE   CO-LEARNER CAREGIVER No   PRIMARY LANGUAGE ENGLISH   LEARNER PREFERENCE PRIMARY DEMONSTRATION   ANSWERED BY patient   RELATIONSHIP SELF       Depression Screening:  :     3 most recent PHQ Screens 2023   Little interest or pleasure in doing things Not at all   Feeling down, depressed, irritable, or hopeless Not at all   Total Score PHQ 2 0   Trouble falling or staying asleep, or sleeping too much -   Feeling tired or having little energy -   Poor appetite, weight loss, or overeating -   Feeling bad about yourself - or that you are a failure or have let yourself or your family down -   Trouble concentrating on things such as school, work, reading, or watching TV -   Moving or speaking so slowly that other people could have noticed; or the opposite being so fidgety that others notice -   Thoughts of being better off dead, or hurting yourself in some way -   PHQ 9 Score -   How difficult have these problems made it for you to do your work, take care of your home and get along with others -       Fall Risk Assessment:  :     No flowsheet data found. Abuse Screening:  :     Abuse Screening Questionnaire 4/25/2023 3/21/2023 3/8/2022   Do you ever feel afraid of your partner? N N N   Are you in a relationship with someone who physically or mentally threatens you? N N N   Is it safe for you to go home? Braeden Hummel       Coordination of Care Questionnaire:  :     1) Have you been to an emergency room, urgent care clinic since your last visit? no   Hospitalized since your last visit? no             2) Have you seen or consulted any other health care providers outside of [address] [place] [address] since your last visit? yes Kike Martínez  (Include any pap smears or colon screenings in this section.)    3) Do you have an Advance Directive on file? no  Are you interested in receiving information about Advance Directives? no    Patient is accompanied by self I have received verbal consent from Bret Lieberman to discuss any/all medical information while they are present in the room. 4.  For patients aged 39-70: Has the patient had a colonoscopy / FIT/ Cologuard? NA - based on age      If the patient is female:    11. For patients aged 41-77: Has the patient had a mammogram within the past 2 years? NA - based on age or sex      10. For patients aged 21-65: Has the patient had a pap smear?  Yes - no [facility] present had yesterday

## 2023-05-01 ENCOUNTER — TELEPHONE (OUTPATIENT)
Dept: FAMILY MEDICINE CLINIC | Age: 28
End: 2023-05-01

## 2023-05-01 LAB
BACTERIA SPEC RESP CULT: NORMAL
BACTERIA SPEC RESP CULT: NORMAL

## 2023-05-01 NOTE — TELEPHONE ENCOUNTER
----- Message from Jeri Boast, MD sent at 5/1/2023  6:45 AM EDT -----  Please let patient know that her respiratory koki was normal and no sign of infection for strep throat at this time. Thanks!

## 2023-05-01 NOTE — PROGRESS NOTES
Please let patient know that her respiratory koki was normal and no sign of infection for strep throat at this time. Thanks!

## 2023-05-09 ENCOUNTER — TRANSCRIBE ORDERS (OUTPATIENT)
Facility: HOSPITAL | Age: 28
End: 2023-05-09

## 2023-05-09 DIAGNOSIS — I51.89 ATRIAL MASS: Primary | ICD-10-CM

## 2023-07-10 ENCOUNTER — OFFICE VISIT (OUTPATIENT)
Age: 28
End: 2023-07-10
Payer: MEDICAID

## 2023-07-10 VITALS
HEIGHT: 67 IN | HEART RATE: 90 BPM | WEIGHT: 134.4 LBS | OXYGEN SATURATION: 98 % | BODY MASS INDEX: 21.09 KG/M2 | TEMPERATURE: 98.8 F | DIASTOLIC BLOOD PRESSURE: 80 MMHG | SYSTOLIC BLOOD PRESSURE: 120 MMHG | RESPIRATION RATE: 16 BRPM

## 2023-07-10 DIAGNOSIS — R10.32 LLQ ABDOMINAL PAIN: Primary | ICD-10-CM

## 2023-07-10 DIAGNOSIS — N64.4 BREAST PAIN, LEFT: ICD-10-CM

## 2023-07-10 LAB
BILIRUBIN, URINE, POC: NEGATIVE
BLOOD URINE, POC: NEGATIVE
GLUCOSE URINE, POC: NEGATIVE
HCG, PREGNANCY, URINE, POC: NEGATIVE
KETONES, URINE, POC: NEGATIVE
LEUKOCYTE ESTERASE, URINE, POC: NEGATIVE
NITRITE, URINE, POC: NEGATIVE
PH, URINE, POC: 6.5 (ref 4.6–8)
PROTEIN,URINE, POC: NEGATIVE
SPECIFIC GRAVITY, URINE, POC: 1.01 (ref 1–1.03)
URINALYSIS CLARITY, POC: CLEAR
URINALYSIS COLOR, POC: NORMAL
UROBILINOGEN, POC: NORMAL
VALID INTERNAL CONTROL, POC: NORMAL

## 2023-07-10 PROCEDURE — PBSHW AMB POC URINE PREGNANCY TEST, VISUAL COLOR COMPARISON: Performed by: INTERNAL MEDICINE

## 2023-07-10 PROCEDURE — 99213 OFFICE O/P EST LOW 20 MIN: CPT | Performed by: INTERNAL MEDICINE

## 2023-07-10 PROCEDURE — PBSHW AMB POC URINALYSIS DIP STICK AUTO W/O MICRO: Performed by: INTERNAL MEDICINE

## 2023-07-10 PROCEDURE — 81003 URINALYSIS AUTO W/O SCOPE: CPT | Performed by: INTERNAL MEDICINE

## 2023-07-10 PROCEDURE — 81025 URINE PREGNANCY TEST: CPT | Performed by: INTERNAL MEDICINE

## 2023-07-10 SDOH — ECONOMIC STABILITY: FOOD INSECURITY: WITHIN THE PAST 12 MONTHS, YOU WORRIED THAT YOUR FOOD WOULD RUN OUT BEFORE YOU GOT MONEY TO BUY MORE.: NEVER TRUE

## 2023-07-10 SDOH — ECONOMIC STABILITY: FOOD INSECURITY: WITHIN THE PAST 12 MONTHS, THE FOOD YOU BOUGHT JUST DIDN'T LAST AND YOU DIDN'T HAVE MONEY TO GET MORE.: NEVER TRUE

## 2023-07-10 SDOH — ECONOMIC STABILITY: INCOME INSECURITY: HOW HARD IS IT FOR YOU TO PAY FOR THE VERY BASICS LIKE FOOD, HOUSING, MEDICAL CARE, AND HEATING?: NOT HARD AT ALL

## 2023-07-10 SDOH — ECONOMIC STABILITY: HOUSING INSECURITY
IN THE LAST 12 MONTHS, WAS THERE A TIME WHEN YOU DID NOT HAVE A STEADY PLACE TO SLEEP OR SLEPT IN A SHELTER (INCLUDING NOW)?: NO

## 2023-07-10 ASSESSMENT — PATIENT HEALTH QUESTIONNAIRE - PHQ9
2. FEELING DOWN, DEPRESSED OR HOPELESS: 0
SUM OF ALL RESPONSES TO PHQ9 QUESTIONS 1 & 2: 0
SUM OF ALL RESPONSES TO PHQ QUESTIONS 1-9: 0
3. TROUBLE FALLING OR STAYING ASLEEP: 0
8. MOVING OR SPEAKING SO SLOWLY THAT OTHER PEOPLE COULD HAVE NOTICED. OR THE OPPOSITE, BEING SO FIGETY OR RESTLESS THAT YOU HAVE BEEN MOVING AROUND A LOT MORE THAN USUAL: 0
10. IF YOU CHECKED OFF ANY PROBLEMS, HOW DIFFICULT HAVE THESE PROBLEMS MADE IT FOR YOU TO DO YOUR WORK, TAKE CARE OF THINGS AT HOME, OR GET ALONG WITH OTHER PEOPLE: 0
SUM OF ALL RESPONSES TO PHQ QUESTIONS 1-9: 0
1. LITTLE INTEREST OR PLEASURE IN DOING THINGS: 0
SUM OF ALL RESPONSES TO PHQ QUESTIONS 1-9: 0
4. FEELING TIRED OR HAVING LITTLE ENERGY: 0
2. FEELING DOWN, DEPRESSED OR HOPELESS: 0
SUM OF ALL RESPONSES TO PHQ QUESTIONS 1-9: 0
SUM OF ALL RESPONSES TO PHQ QUESTIONS 1-9: 0
5. POOR APPETITE OR OVEREATING: 0
SUM OF ALL RESPONSES TO PHQ9 QUESTIONS 1 & 2: 0
SUM OF ALL RESPONSES TO PHQ QUESTIONS 1-9: 0
1. LITTLE INTEREST OR PLEASURE IN DOING THINGS: 0
SUM OF ALL RESPONSES TO PHQ QUESTIONS 1-9: 0
7. TROUBLE CONCENTRATING ON THINGS, SUCH AS READING THE NEWSPAPER OR WATCHING TELEVISION: 0
9. THOUGHTS THAT YOU WOULD BE BETTER OFF DEAD, OR OF HURTING YOURSELF: 0
6. FEELING BAD ABOUT YOURSELF - OR THAT YOU ARE A FAILURE OR HAVE LET YOURSELF OR YOUR FAMILY DOWN: 0
SUM OF ALL RESPONSES TO PHQ QUESTIONS 1-9: 0

## 2023-07-10 ASSESSMENT — ENCOUNTER SYMPTOMS: ABDOMINAL PAIN: 1

## 2023-07-11 ENCOUNTER — TELEPHONE (OUTPATIENT)
Age: 28
End: 2023-07-11

## 2023-07-11 NOTE — PROGRESS NOTES
Chief Complaint   Patient presents with    Bloated     Abdominal mass baseball size. Also she reports having a stabbing pain. Sharp pain in hip bone. All of this comes and goes. Assessment/ Plan:   Diagnoses and all orders for this visit:    LLQ abdominal pain  -     AMB POC URINALYSIS DIP STICK AUTO W/O MICRO  -     AMB POC URINE PREGNANCY TEST, VISUAL COLOR COMPARISON  -     CT ABDOMEN PELVIS W WO CONTRAST Additional Contrast? Radiologist Recommendation; Future    Breast pain, left  -     THO DIGITAL DIAGNOSTIC W OR WO CAD BILATERAL; Future  -     US BREAST AXILLA LYMPH NODE LEFT; Future      I have discussed the diagnosis with the patient and the intended treatment plan as seen in the above orders. The patient has received an after-visit summary and questions were answered concerning future plans. Asked to return should symptoms worsen or not improve with treatment. Any pending labs and studies will be relayed to patient when they become available. Pt verbalizes understanding of plan of care and denies further questions or concerns at this time. Follow Up:  Return if symptoms worsen or fail to improve. Subjective:   Serge Bernabe is a 33 y.o. female who presents with her  for evaluation of new mass like lesion in her abdomen which he saw and she felt while she was in the tub. At that time, she had discomfort in the LLQ and near the L-hip. It seemed to go away, but has returned off on and and always uncomfortable. She denies any change in her stools. She has a low lingering pain in the lower abdomen. In addition, she has noticed onset of L-breast pain and tenderness that seems to have worsened in the 2-weeks. HISTORICAL  PMH, PSH, FHX, SOCHX, ALLERGIES and MES were reviewed and updated today. Review of Systems  Review of Systems   Cardiovascular:  Positive for chest pain (L-breast pain). Gastrointestinal:  Positive for abdominal pain.    All other systems reviewed and are

## 2023-07-11 NOTE — TELEPHONE ENCOUNTER
----- Message from Delores Rios MD sent at 7/10/2023  6:34 PM EDT -----  Please let patient know that her urine and pregnancy test were negative. Thanks!

## 2023-07-18 ENCOUNTER — HOSPITAL ENCOUNTER (OUTPATIENT)
Facility: HOSPITAL | Age: 28
Discharge: HOME | End: 2023-07-21
Attending: INTERNAL MEDICINE
Payer: MEDICAID

## 2023-07-18 DIAGNOSIS — R10.32 LLQ ABDOMINAL PAIN: ICD-10-CM

## 2023-07-18 PROCEDURE — 74177 CT ABD & PELVIS W/CONTRAST: CPT

## 2023-07-18 PROCEDURE — 6360000004 HC RX CONTRAST MEDICATION: Performed by: INTERNAL MEDICINE

## 2023-07-18 RX ADMIN — IOPAMIDOL 100 ML: 755 INJECTION, SOLUTION INTRAVENOUS at 10:29

## 2023-07-21 ENCOUNTER — TELEPHONE (OUTPATIENT)
Age: 28
End: 2023-07-21

## 2023-07-21 NOTE — TELEPHONE ENCOUNTER
----- Message from Paul Martinez MD sent at 7/20/2023  5:50 PM EDT -----  Please let patient know that her abdominal and pelvic CT were normal and showed no concerning lesions or problems at this time.

## 2023-09-21 DIAGNOSIS — R19.5 CHANGE IN STOOL: ICD-10-CM

## 2023-09-21 DIAGNOSIS — R10.32 LLQ ABDOMINAL PAIN: Primary | ICD-10-CM

## 2024-06-27 ENCOUNTER — OFFICE VISIT (OUTPATIENT)
Age: 29
End: 2024-06-27
Payer: MEDICAID

## 2024-06-27 VITALS
RESPIRATION RATE: 17 BRPM | HEIGHT: 67 IN | TEMPERATURE: 97.3 F | DIASTOLIC BLOOD PRESSURE: 74 MMHG | BODY MASS INDEX: 23.54 KG/M2 | SYSTOLIC BLOOD PRESSURE: 116 MMHG | WEIGHT: 150 LBS

## 2024-06-27 DIAGNOSIS — M79.605 LEFT LEG PAIN: ICD-10-CM

## 2024-06-27 DIAGNOSIS — R20.2 NUMBNESS AND TINGLING: ICD-10-CM

## 2024-06-27 DIAGNOSIS — R20.0 NUMBNESS AND TINGLING: ICD-10-CM

## 2024-06-27 DIAGNOSIS — M25.552 LEFT HIP PAIN: Primary | ICD-10-CM

## 2024-06-27 PROCEDURE — 99213 OFFICE O/P EST LOW 20 MIN: CPT | Performed by: INTERNAL MEDICINE

## 2024-06-27 ASSESSMENT — PATIENT HEALTH QUESTIONNAIRE - PHQ9
6. FEELING BAD ABOUT YOURSELF - OR THAT YOU ARE A FAILURE OR HAVE LET YOURSELF OR YOUR FAMILY DOWN: NOT AT ALL
SUM OF ALL RESPONSES TO PHQ QUESTIONS 1-9: 0
SUM OF ALL RESPONSES TO PHQ QUESTIONS 1-9: 0
3. TROUBLE FALLING OR STAYING ASLEEP: NOT AT ALL
SUM OF ALL RESPONSES TO PHQ QUESTIONS 1-9: 0
10. IF YOU CHECKED OFF ANY PROBLEMS, HOW DIFFICULT HAVE THESE PROBLEMS MADE IT FOR YOU TO DO YOUR WORK, TAKE CARE OF THINGS AT HOME, OR GET ALONG WITH OTHER PEOPLE: NOT DIFFICULT AT ALL
2. FEELING DOWN, DEPRESSED OR HOPELESS: NOT AT ALL
SUM OF ALL RESPONSES TO PHQ9 QUESTIONS 1 & 2: 0
5. POOR APPETITE OR OVEREATING: NOT AT ALL
1. LITTLE INTEREST OR PLEASURE IN DOING THINGS: NOT AT ALL
9. THOUGHTS THAT YOU WOULD BE BETTER OFF DEAD, OR OF HURTING YOURSELF: NOT AT ALL
8. MOVING OR SPEAKING SO SLOWLY THAT OTHER PEOPLE COULD HAVE NOTICED. OR THE OPPOSITE, BEING SO FIGETY OR RESTLESS THAT YOU HAVE BEEN MOVING AROUND A LOT MORE THAN USUAL: NOT AT ALL
4. FEELING TIRED OR HAVING LITTLE ENERGY: NOT AT ALL
7. TROUBLE CONCENTRATING ON THINGS, SUCH AS READING THE NEWSPAPER OR WATCHING TELEVISION: NOT AT ALL
SUM OF ALL RESPONSES TO PHQ QUESTIONS 1-9: 0

## 2024-06-27 NOTE — PROGRESS NOTES
[facility]   Acute Visit Progress Note  Patient: Rupal Liao  1995, 29 y.o., female  Encounter Date: 6/27/24    CHIEF COMPLAINT:  Chief Complaint   Patient presents with    Hip Pain     Patient states she has been experiencing hip pain for over a year and would like a referral   Patient states most times pain radiates down to left leg        ASSESSMENT & PLAN:    ICD-10-CM    1. Left hip pain  M25.552 XR LUMBAR SPINE (2-3 VIEWS)      2. Left leg pain  M79.605 XR LUMBAR SPINE (2-3 VIEWS)      3. Numbness and tingling  R20.0 XR LUMBAR SPINE (2-3 VIEWS)    R20.2         I have discussed the diagnosis with the patient and the intended treatment plan as seen in the above orders. The patient has received an after-visit summary and questions were answered concerning future plans. Asked to return should symptoms worsen or not improve with treatment. Any pending labs and studies will be relayed to patient when they become available.     Pt verbalizes understanding of plan of care and denies further questions or concerns at this time    No follow-ups on file.    SUBJECTIVE  Rupal Liao is a 29 y.o. female presenting today for worsening L-hip pain. She feels like the pain was there prior to her pregnancy. At that time, she had an US of the area and then an xray of the hip that was normal. However, the pain is almost constantly there even after having her baby. She feels like the pain is deep in the hip/possible back. At it's worse, it is about 7-8/10.     Review of Systems   Constitutional:  Positive for activity change.   Musculoskeletal:  Positive for arthralgias, gait problem and myalgias.   All other systems reviewed and are negative.    HISTORICAL  PMH, PSH, FHX, SOCHX, ALLERGIES and MEDS were reviewed and updated today.    OBJECTIVE  /74 (Site: Right Upper Arm, Position: Sitting, Cuff Size: Medium Adult)   Temp 97.3 °F (36.3 °C) (Temporal)   Resp 17   Ht 1.702 m (5' 7\")   Wt 68

## 2024-06-27 NOTE — PROGRESS NOTES
Identified pt with two pt identifiers(name and ).    Chief Complaint   Patient presents with    Hip Pain     Patient states she has been experiencing hip pain for over a year and would like a referral   Patient states most times pain radiates down to left leg        Health Maintenance Due   Topic    Hepatitis B vaccine (1 of 3 - 3-dose series)    COVID-19 Vaccine (1)    Varicella vaccine (1 of 2 - 2-dose childhood series)    HIV screen     DTaP/Tdap/Td vaccine (1 - Tdap)    Depression Monitoring        Wt Readings from Last 3 Encounters:   24 68 kg (150 lb)   07/10/23 61 kg (134 lb 6.4 oz)   23 61.1 kg (134 lb 9.6 oz)     Temp Readings from Last 3 Encounters:   24 97.3 °F (36.3 °C) (Temporal)   07/10/23 98.8 °F (37.1 °C) (Temporal)     BP Readings from Last 3 Encounters:   24 116/74   07/10/23 120/80   23 120/78     Pulse Readings from Last 3 Encounters:   07/10/23 90   23 90   23 80           Depression Screening:  :         2024     1:57 PM 7/10/2023     4:25 PM 7/10/2023     4:24 PM 2023     4:00 PM 2023     1:09 PM 3/21/2023     1:00 PM   PHQ-9 Questionaire   Little interest or pleasure in doing things 0 0 0 0 0 0   Feeling down, depressed, or hopeless 0 0 0 0 0 0   Trouble falling or staying asleep, or sleeping too much 0 0   0    Feeling tired or having little energy 0 0   0    Poor appetite or overeating 0 0   0    Feeling bad about yourself - or that you are a failure or have let yourself or your family down 0 0   0    Trouble concentrating on things, such as reading the newspaper or watching television 0 0   0    Moving or speaking so slowly that other people could have noticed. Or the opposite - being so fidgety or restless that you have been moving around a lot more than usual 0 0   0    Thoughts that you would be better off dead, or of hurting yourself in some way 0 0       PHQ-9 Total Score 0 0 0 0 0 0   If you checked off any problems, how

## 2024-07-15 ENCOUNTER — HOSPITAL ENCOUNTER (OUTPATIENT)
Facility: HOSPITAL | Age: 29
Discharge: HOME | End: 2024-07-18
Payer: MEDICAID

## 2024-07-15 DIAGNOSIS — M79.605 LEFT LEG PAIN: ICD-10-CM

## 2024-07-15 DIAGNOSIS — R20.2 NUMBNESS AND TINGLING: ICD-10-CM

## 2024-07-15 DIAGNOSIS — R20.0 NUMBNESS AND TINGLING: ICD-10-CM

## 2024-07-15 DIAGNOSIS — M25.552 LEFT HIP PAIN: ICD-10-CM

## 2024-07-15 PROCEDURE — 72100 X-RAY EXAM L-S SPINE 2/3 VWS: CPT

## 2024-07-16 ENCOUNTER — TELEPHONE (OUTPATIENT)
Age: 29
End: 2024-07-16

## 2024-07-16 DIAGNOSIS — M54.50 CHRONIC BILATERAL LOW BACK PAIN WITHOUT SCIATICA: Primary | ICD-10-CM

## 2024-07-16 DIAGNOSIS — G89.29 CHRONIC BILATERAL LOW BACK PAIN WITHOUT SCIATICA: Primary | ICD-10-CM

## 2024-07-16 NOTE — TELEPHONE ENCOUNTER
----- Message from Katia White MD sent at 7/16/2024  3:01 PM EDT -----  I have placed referral for back specialist.   Thanks!     ----- Message -----  From: Arlin Abbasi MA  Sent: 7/16/2024  11:16 AM EDT  To: Katia White MD    Patient is still having pain, I will call patient back with referral information once its placed.    Thanks Dr. White   ----- Message -----  From: Katia White MD  Sent: 7/16/2024   8:38 AM EDT  To: Arlin Abbasi MA    She will need to see a specialist. I never promise CT-MRI. Only if there is something substantial found on the xray. I can give her a referral to a back specialist if she is still in pain.   Thanks!     ----- Message -----  From: Arlin Abbasi MA  Sent: 7/16/2024   8:20 AM EDT  To: Katia White MD    Called patient relayed results, she was under the impression if the xrays you were going to put in additional imaging(CT or MRI) as her issues have been going on for over a year. Advised I would send a message back   ----- Message -----  From: Katia White MD  Sent: 7/15/2024   3:57 PM EDT  To: Arlin Abbasi MA    Please let patient know that her films were normal. However, they did notice increased stool (constipation). If her symptoms should continue, she may need to see a specialist who may order an MRI. Thanks!

## (undated) LAB
ALBUMIN SERPL-MCNC: 4.7 G/DL (ref 3.9–5)
ALBUMIN/GLOB SERPL: 2.2 {RATIO} (ref 1.2–2.2)
ALP SERPL-CCNC: 45 IU/L (ref 44–121)
ALT SERPL-CCNC: 13 IU/L (ref 0–32)
AST SERPL-CCNC: 14 IU/L (ref 0–40)
BASOPHILS # BLD AUTO: 0 X10E3/UL (ref 0–0.2)
BASOPHILS NFR BLD AUTO: 1 %
BILIRUB SERPL-MCNC: 0.4 MG/DL (ref 0–1.2)
BUN SERPL-MCNC: 13 MG/DL (ref 6–20)
BUN/CREAT SERPL: 16 (ref 9–23)
CALCIUM SERPL-MCNC: 9.4 MG/DL (ref 8.7–10.2)
CHLORIDE SERPL-SCNC: 101 MMOL/L (ref 96–106)
CO2 SERPL-SCNC: 18 MMOL/L (ref 20–29)
CREAT SERPL-MCNC: 0.79 MG/DL (ref 0.57–1)
EGFRCR SERPLBLD CKD-EPI 2021: 106 ML/MIN/1.73
EOSINOPHIL # BLD AUTO: 0.1 X10E3/UL (ref 0–0.4)
EOSINOPHIL NFR BLD AUTO: 1 %
ERYTHROCYTE [DISTWIDTH] IN BLOOD BY AUTOMATED COUNT: 12.4 % (ref 11.7–15.4)
GLOBULIN SER CALC-MCNC: 2.1 G/DL (ref 1.5–4.5)
GLUCOSE SERPL-MCNC: 79 MG/DL (ref 70–99)
HCT VFR BLD AUTO: 37.2 % (ref 34–46.6)
HGB BLD-MCNC: 13.6 G/DL (ref 11.1–15.9)
IMM GRANULOCYTES # BLD AUTO: 0 X10E3/UL (ref 0–0.1)
IMM GRANULOCYTES NFR BLD AUTO: 0 %
LYMPHOCYTES # BLD AUTO: 1.6 X10E3/UL (ref 0.7–3.1)
LYMPHOCYTES NFR BLD AUTO: 38 %
MCH RBC QN AUTO: 32.4 PG (ref 26.6–33)
MCHC RBC AUTO-ENTMCNC: 36.6 G/DL (ref 31.5–35.7)
MCV RBC AUTO: 89 FL (ref 79–97)
MONOCYTES # BLD AUTO: 0.5 X10E3/UL (ref 0.1–0.9)
MONOCYTES NFR BLD AUTO: 11 %
MORPHOLOGY BLD-IMP: ABNORMAL
NEUTROPHILS # BLD AUTO: 2 X10E3/UL (ref 1.4–7)
NEUTROPHILS NFR BLD AUTO: 49 %
PLATELET # BLD AUTO: 259 X10E3/UL (ref 150–450)
POTASSIUM SERPL-SCNC: 4.5 MMOL/L (ref 3.5–5.2)
PROT SERPL-MCNC: 6.8 G/DL (ref 6–8.5)
RBC # BLD AUTO: 4.2 X10E6/UL (ref 3.77–5.28)
SODIUM SERPL-SCNC: 137 MMOL/L (ref 134–144)
TSH SERPL DL<=0.005 MIU/L-ACNC: 1.25 UIU/ML (ref 0.45–4.5)
WBC # BLD AUTO: 4.2 X10E3/UL (ref 3.4–10.8)